# Patient Record
Sex: FEMALE | Race: WHITE | NOT HISPANIC OR LATINO | Employment: FULL TIME | URBAN - METROPOLITAN AREA
[De-identification: names, ages, dates, MRNs, and addresses within clinical notes are randomized per-mention and may not be internally consistent; named-entity substitution may affect disease eponyms.]

---

## 2018-01-12 ENCOUNTER — TRANSFERRED RECORDS (OUTPATIENT)
Dept: HEALTH INFORMATION MANAGEMENT | Facility: CLINIC | Age: 53
End: 2018-01-12

## 2019-08-07 ENCOUNTER — TRANSFERRED RECORDS (OUTPATIENT)
Dept: HEALTH INFORMATION MANAGEMENT | Facility: CLINIC | Age: 54
End: 2019-08-07

## 2019-08-15 ENCOUNTER — APPOINTMENT (OUTPATIENT)
Dept: CT IMAGING | Facility: CLINIC | Age: 54
End: 2019-08-15
Attending: EMERGENCY MEDICINE
Payer: COMMERCIAL

## 2019-08-15 ENCOUNTER — ANESTHESIA EVENT (OUTPATIENT)
Dept: SURGERY | Facility: CLINIC | Age: 54
End: 2019-08-15
Payer: COMMERCIAL

## 2019-08-15 ENCOUNTER — HOSPITAL ENCOUNTER (OUTPATIENT)
Facility: CLINIC | Age: 54
Discharge: HOME | End: 2019-08-16
Attending: EMERGENCY MEDICINE | Admitting: SURGERY
Payer: COMMERCIAL

## 2019-08-15 ENCOUNTER — ANESTHESIA (OUTPATIENT)
Dept: SURGERY | Facility: CLINIC | Age: 54
End: 2019-08-15
Payer: COMMERCIAL

## 2019-08-15 DIAGNOSIS — K35.30 ACUTE APPENDICITIS WITH LOCALIZED PERITONITIS, WITHOUT PERFORATION, ABSCESS, OR GANGRENE: ICD-10-CM

## 2019-08-15 PROBLEM — K37 APPENDICITIS: Status: ACTIVE | Noted: 2019-08-15

## 2019-08-15 LAB
ALBUMIN SERPL-MCNC: 3.8 G/DL (ref 3.4–5)
ALP SERPL-CCNC: 87 U/L (ref 40–150)
ALT SERPL W P-5'-P-CCNC: 16 U/L (ref 0–50)
ANION GAP SERPL CALCULATED.3IONS-SCNC: 8 MMOL/L (ref 3–14)
AST SERPL W P-5'-P-CCNC: 22 U/L (ref 0–45)
BASOPHILS # BLD AUTO: 0.1 10E9/L (ref 0–0.2)
BASOPHILS NFR BLD AUTO: 0.5 %
BILIRUB SERPL-MCNC: 0.5 MG/DL (ref 0.2–1.3)
BUN SERPL-MCNC: 16 MG/DL (ref 7–30)
CALCIUM SERPL-MCNC: 9.8 MG/DL (ref 8.5–10.1)
CHLORIDE SERPL-SCNC: 102 MMOL/L (ref 94–109)
CO2 SERPL-SCNC: 25 MMOL/L (ref 20–32)
CREAT SERPL-MCNC: 0.81 MG/DL (ref 0.52–1.04)
DIFFERENTIAL METHOD BLD: ABNORMAL
EOSINOPHIL # BLD AUTO: 0.2 10E9/L (ref 0–0.7)
EOSINOPHIL NFR BLD AUTO: 1.1 %
ERYTHROCYTE [DISTWIDTH] IN BLOOD BY AUTOMATED COUNT: 12.7 % (ref 10–15)
GFR SERPL CREATININE-BSD FRML MDRD: 82 ML/MIN/{1.73_M2}
GLUCOSE BLDC GLUCOMTR-MCNC: 117 MG/DL (ref 70–99)
GLUCOSE SERPL-MCNC: 129 MG/DL (ref 70–99)
HCT VFR BLD AUTO: 42.9 % (ref 35–47)
HGB BLD-MCNC: 14.4 G/DL (ref 11.7–15.7)
IMM GRANULOCYTES # BLD: 0.1 10E9/L (ref 0–0.4)
IMM GRANULOCYTES NFR BLD: 0.4 %
INR PPP: 0.96 (ref 0.86–1.14)
LACTATE BLD-SCNC: 2 MMOL/L (ref 0.7–2)
LIPASE SERPL-CCNC: 290 U/L (ref 73–393)
LYMPHOCYTES # BLD AUTO: 1.9 10E9/L (ref 0.8–5.3)
LYMPHOCYTES NFR BLD AUTO: 14.3 %
MCH RBC QN AUTO: 32.4 PG (ref 26.5–33)
MCHC RBC AUTO-ENTMCNC: 33.6 G/DL (ref 31.5–36.5)
MCV RBC AUTO: 96 FL (ref 78–100)
MONOCYTES # BLD AUTO: 1 10E9/L (ref 0–1.3)
MONOCYTES NFR BLD AUTO: 7.6 %
NEUTROPHILS # BLD AUTO: 10 10E9/L (ref 1.6–8.3)
NEUTROPHILS NFR BLD AUTO: 76.1 %
NRBC # BLD AUTO: 0 10*3/UL
NRBC BLD AUTO-RTO: 0 /100
PLATELET # BLD AUTO: 181 10E9/L (ref 150–450)
POTASSIUM SERPL-SCNC: 3.9 MMOL/L (ref 3.4–5.3)
PROT SERPL-MCNC: 7.4 G/DL (ref 6.8–8.8)
RADIOLOGIST FLAGS: ABNORMAL
RBC # BLD AUTO: 4.45 10E12/L (ref 3.8–5.2)
SODIUM SERPL-SCNC: 136 MMOL/L (ref 133–144)
WBC # BLD AUTO: 13.2 10E9/L (ref 4–11)

## 2019-08-15 PROCEDURE — 25800030 ZZH RX IP 258 OP 636: Performed by: NURSE ANESTHETIST, CERTIFIED REGISTERED

## 2019-08-15 PROCEDURE — 36000059 ZZH SURGERY LEVEL 3 EA 15 ADDTL MIN UMMC: Performed by: SURGERY

## 2019-08-15 PROCEDURE — 74177 CT ABD & PELVIS W/CONTRAST: CPT

## 2019-08-15 PROCEDURE — 82962 GLUCOSE BLOOD TEST: CPT

## 2019-08-15 PROCEDURE — 88304 TISSUE EXAM BY PATHOLOGIST: CPT | Performed by: SURGERY

## 2019-08-15 PROCEDURE — 36000057 ZZH SURGERY LEVEL 3 1ST 30 MIN - UMMC: Performed by: SURGERY

## 2019-08-15 PROCEDURE — 25000128 H RX IP 250 OP 636: Performed by: ANESTHESIOLOGY

## 2019-08-15 PROCEDURE — 25800030 ZZH RX IP 258 OP 636: Performed by: EMERGENCY MEDICINE

## 2019-08-15 PROCEDURE — 25000125 ZZHC RX 250: Performed by: NURSE ANESTHETIST, CERTIFIED REGISTERED

## 2019-08-15 PROCEDURE — 25000132 ZZH RX MED GY IP 250 OP 250 PS 637: Performed by: STUDENT IN AN ORGANIZED HEALTH CARE EDUCATION/TRAINING PROGRAM

## 2019-08-15 PROCEDURE — 96374 THER/PROPH/DIAG INJ IV PUSH: CPT | Performed by: EMERGENCY MEDICINE

## 2019-08-15 PROCEDURE — 37000009 ZZH ANESTHESIA TECHNICAL FEE, EACH ADDTL 15 MIN: Performed by: SURGERY

## 2019-08-15 PROCEDURE — 27210794 ZZH OR GENERAL SUPPLY STERILE: Performed by: SURGERY

## 2019-08-15 PROCEDURE — 37000008 ZZH ANESTHESIA TECHNICAL FEE, 1ST 30 MIN: Performed by: SURGERY

## 2019-08-15 PROCEDURE — 99284 EMERGENCY DEPT VISIT MOD MDM: CPT | Mod: Z6 | Performed by: EMERGENCY MEDICINE

## 2019-08-15 PROCEDURE — 80053 COMPREHEN METABOLIC PANEL: CPT | Performed by: EMERGENCY MEDICINE

## 2019-08-15 PROCEDURE — 25000128 H RX IP 250 OP 636: Performed by: SURGERY

## 2019-08-15 PROCEDURE — 83605 ASSAY OF LACTIC ACID: CPT | Performed by: EMERGENCY MEDICINE

## 2019-08-15 PROCEDURE — 25000128 H RX IP 250 OP 636: Performed by: EMERGENCY MEDICINE

## 2019-08-15 PROCEDURE — 25000128 H RX IP 250 OP 636: Performed by: STUDENT IN AN ORGANIZED HEALTH CARE EDUCATION/TRAINING PROGRAM

## 2019-08-15 PROCEDURE — 96376 TX/PRO/DX INJ SAME DRUG ADON: CPT | Performed by: EMERGENCY MEDICINE

## 2019-08-15 PROCEDURE — 83690 ASSAY OF LIPASE: CPT | Performed by: EMERGENCY MEDICINE

## 2019-08-15 PROCEDURE — 40000170 ZZH STATISTIC PRE-PROCEDURE ASSESSMENT II: Performed by: SURGERY

## 2019-08-15 PROCEDURE — 25000128 H RX IP 250 OP 636: Performed by: NURSE ANESTHETIST, CERTIFIED REGISTERED

## 2019-08-15 PROCEDURE — 99285 EMERGENCY DEPT VISIT HI MDM: CPT | Mod: 25 | Performed by: EMERGENCY MEDICINE

## 2019-08-15 PROCEDURE — 85025 COMPLETE CBC W/AUTO DIFF WBC: CPT | Performed by: EMERGENCY MEDICINE

## 2019-08-15 PROCEDURE — 96372 THER/PROPH/DIAG INJ SC/IM: CPT | Performed by: EMERGENCY MEDICINE

## 2019-08-15 PROCEDURE — 71000015 ZZH RECOVERY PHASE 1 LEVEL 2 EA ADDTL HR: Performed by: SURGERY

## 2019-08-15 PROCEDURE — 71000014 ZZH RECOVERY PHASE 1 LEVEL 2 FIRST HR: Performed by: SURGERY

## 2019-08-15 PROCEDURE — 25000565 ZZH ISOFLURANE, EA 15 MIN: Performed by: SURGERY

## 2019-08-15 PROCEDURE — 85610 PROTHROMBIN TIME: CPT | Performed by: EMERGENCY MEDICINE

## 2019-08-15 PROCEDURE — 25000125 ZZHC RX 250: Performed by: EMERGENCY MEDICINE

## 2019-08-15 PROCEDURE — 96361 HYDRATE IV INFUSION ADD-ON: CPT | Mod: 59 | Performed by: EMERGENCY MEDICINE

## 2019-08-15 RX ORDER — SODIUM CHLORIDE 9 MG/ML
1000 INJECTION, SOLUTION INTRAVENOUS CONTINUOUS
Status: DISCONTINUED | OUTPATIENT
Start: 2019-08-15 | End: 2019-08-15

## 2019-08-15 RX ORDER — FENTANYL CITRATE 50 UG/ML
25-50 INJECTION, SOLUTION INTRAMUSCULAR; INTRAVENOUS
Status: DISCONTINUED | OUTPATIENT
Start: 2019-08-15 | End: 2019-08-15 | Stop reason: HOSPADM

## 2019-08-15 RX ORDER — NALOXONE HYDROCHLORIDE 0.4 MG/ML
.1-.4 INJECTION, SOLUTION INTRAMUSCULAR; INTRAVENOUS; SUBCUTANEOUS
Status: DISCONTINUED | OUTPATIENT
Start: 2019-08-15 | End: 2019-08-15

## 2019-08-15 RX ORDER — ONDANSETRON 2 MG/ML
4 INJECTION INTRAMUSCULAR; INTRAVENOUS EVERY 30 MIN PRN
Status: DISCONTINUED | OUTPATIENT
Start: 2019-08-15 | End: 2019-08-15

## 2019-08-15 RX ORDER — HYDROMORPHONE HYDROCHLORIDE 1 MG/ML
1 INJECTION, SOLUTION INTRAMUSCULAR; INTRAVENOUS; SUBCUTANEOUS ONCE
Status: COMPLETED | OUTPATIENT
Start: 2019-08-15 | End: 2019-08-15

## 2019-08-15 RX ORDER — CLINDAMYCIN PHOSPHATE 11.9 MG/ML
SOLUTION TOPICAL DAILY PRN
Status: DISCONTINUED | OUTPATIENT
Start: 2019-08-15 | End: 2019-08-16 | Stop reason: HOSPADM

## 2019-08-15 RX ORDER — PROPOFOL 10 MG/ML
INJECTION, EMULSION INTRAVENOUS PRN
Status: DISCONTINUED | OUTPATIENT
Start: 2019-08-15 | End: 2019-08-15

## 2019-08-15 RX ORDER — SODIUM CHLORIDE, SODIUM LACTATE, POTASSIUM CHLORIDE, CALCIUM CHLORIDE 600; 310; 30; 20 MG/100ML; MG/100ML; MG/100ML; MG/100ML
INJECTION, SOLUTION INTRAVENOUS CONTINUOUS PRN
Status: DISCONTINUED | OUTPATIENT
Start: 2019-08-15 | End: 2019-08-15

## 2019-08-15 RX ORDER — ZOLMITRIPTAN 5 MG/1
1 SPRAY NASAL
Status: DISCONTINUED | OUTPATIENT
Start: 2019-08-15 | End: 2019-08-15

## 2019-08-15 RX ORDER — EZETIMIBE 10 MG/1
10 TABLET ORAL DAILY
COMMUNITY

## 2019-08-15 RX ORDER — OXYCODONE HYDROCHLORIDE 5 MG/1
5 TABLET ORAL EVERY 6 HOURS PRN
Qty: 5 TABLET | Refills: 0 | Status: SHIPPED | OUTPATIENT
Start: 2019-08-15 | End: 2019-08-20

## 2019-08-15 RX ORDER — DIAZEPAM 2 MG
2 TABLET ORAL EVERY 12 HOURS PRN
COMMUNITY
End: 2020-11-23

## 2019-08-15 RX ORDER — LAMOTRIGINE 25 MG/1
50 TABLET ORAL EVERY MORNING
Status: DISCONTINUED | OUTPATIENT
Start: 2019-08-16 | End: 2019-08-16 | Stop reason: HOSPADM

## 2019-08-15 RX ORDER — ACETAMINOPHEN 325 MG/1
650 TABLET ORAL EVERY 4 HOURS PRN
Status: DISCONTINUED | OUTPATIENT
Start: 2019-08-15 | End: 2019-08-16 | Stop reason: HOSPADM

## 2019-08-15 RX ORDER — ONDANSETRON 2 MG/ML
4 INJECTION INTRAMUSCULAR; INTRAVENOUS EVERY 6 HOURS PRN
Status: DISCONTINUED | OUTPATIENT
Start: 2019-08-15 | End: 2019-08-15

## 2019-08-15 RX ORDER — LABETALOL HYDROCHLORIDE 5 MG/ML
10 INJECTION, SOLUTION INTRAVENOUS
Status: DISCONTINUED | OUTPATIENT
Start: 2019-08-15 | End: 2019-08-15 | Stop reason: HOSPADM

## 2019-08-15 RX ORDER — ALMOTRIPTAN 12.5 MG/1
12.5 TABLET, FILM COATED ORAL DAILY PRN
Status: DISCONTINUED | OUTPATIENT
Start: 2019-08-15 | End: 2019-08-16 | Stop reason: HOSPADM

## 2019-08-15 RX ORDER — ACETAMINOPHEN 325 MG/1
650 TABLET ORAL EVERY 6 HOURS PRN
Status: DISCONTINUED | OUTPATIENT
Start: 2019-08-15 | End: 2019-08-15 | Stop reason: DRUGHIGH

## 2019-08-15 RX ORDER — CYCLOBENZAPRINE HCL 10 MG
10 TABLET ORAL AT BEDTIME
COMMUNITY

## 2019-08-15 RX ORDER — VILAZODONE HYDROCHLORIDE 10 MG/1
10 TABLET ORAL AT BEDTIME
Status: DISCONTINUED | OUTPATIENT
Start: 2019-08-15 | End: 2019-08-16 | Stop reason: HOSPADM

## 2019-08-15 RX ORDER — ACYCLOVIR 50 MG/G
CREAM TOPICAL
Status: DISCONTINUED | OUTPATIENT
Start: 2019-08-15 | End: 2019-08-15 | Stop reason: CLARIF

## 2019-08-15 RX ORDER — CEFTRIAXONE 2 G/1
2 INJECTION, POWDER, FOR SOLUTION INTRAMUSCULAR; INTRAVENOUS EVERY 24 HOURS
Status: DISCONTINUED | OUTPATIENT
Start: 2019-08-15 | End: 2019-08-15

## 2019-08-15 RX ORDER — HYDROMORPHONE HYDROCHLORIDE 1 MG/ML
.3-.5 INJECTION, SOLUTION INTRAMUSCULAR; INTRAVENOUS; SUBCUTANEOUS EVERY 5 MIN PRN
Status: DISCONTINUED | OUTPATIENT
Start: 2019-08-15 | End: 2019-08-15 | Stop reason: HOSPADM

## 2019-08-15 RX ORDER — LABETALOL HYDROCHLORIDE 5 MG/ML
INJECTION, SOLUTION INTRAVENOUS PRN
Status: DISCONTINUED | OUTPATIENT
Start: 2019-08-15 | End: 2019-08-15

## 2019-08-15 RX ORDER — OLANZAPINE 10 MG/2ML
2.5 INJECTION, POWDER, FOR SOLUTION INTRAMUSCULAR ONCE
Status: COMPLETED | OUTPATIENT
Start: 2019-08-15 | End: 2019-08-15

## 2019-08-15 RX ORDER — HYDROMORPHONE HYDROCHLORIDE 1 MG/ML
0.5 INJECTION, SOLUTION INTRAMUSCULAR; INTRAVENOUS; SUBCUTANEOUS
Status: COMPLETED | OUTPATIENT
Start: 2019-08-15 | End: 2019-08-15

## 2019-08-15 RX ORDER — LIDOCAINE 40 MG/G
CREAM TOPICAL
Status: DISCONTINUED | OUTPATIENT
Start: 2019-08-15 | End: 2019-08-16 | Stop reason: HOSPADM

## 2019-08-15 RX ORDER — CLONIDINE HYDROCHLORIDE 0.1 MG/1
0.1 TABLET ORAL 3 TIMES DAILY
Status: DISCONTINUED | OUTPATIENT
Start: 2019-08-16 | End: 2019-08-16 | Stop reason: HOSPADM

## 2019-08-15 RX ORDER — VILAZODONE HYDROCHLORIDE 10 MG/1
10 TABLET ORAL AT BEDTIME
COMMUNITY

## 2019-08-15 RX ORDER — HYDROMORPHONE HCL/0.9% NACL/PF 0.2MG/0.2
0.2 SYRINGE (ML) INTRAVENOUS
Status: DISCONTINUED | OUTPATIENT
Start: 2019-08-15 | End: 2019-08-15

## 2019-08-15 RX ORDER — BUPIVACAINE HYDROCHLORIDE 5 MG/ML
INJECTION, SOLUTION PERINEURAL PRN
Status: DISCONTINUED | OUTPATIENT
Start: 2019-08-15 | End: 2019-08-15 | Stop reason: HOSPADM

## 2019-08-15 RX ORDER — ONDANSETRON 2 MG/ML
4 INJECTION INTRAMUSCULAR; INTRAVENOUS EVERY 30 MIN PRN
Status: DISCONTINUED | OUTPATIENT
Start: 2019-08-15 | End: 2019-08-15 | Stop reason: HOSPADM

## 2019-08-15 RX ORDER — ONDANSETRON 2 MG/ML
4 INJECTION INTRAMUSCULAR; INTRAVENOUS EVERY 6 HOURS PRN
Status: DISCONTINUED | OUTPATIENT
Start: 2019-08-15 | End: 2019-08-16 | Stop reason: HOSPADM

## 2019-08-15 RX ORDER — NALOXONE HYDROCHLORIDE 0.4 MG/ML
.1-.4 INJECTION, SOLUTION INTRAMUSCULAR; INTRAVENOUS; SUBCUTANEOUS
Status: DISCONTINUED | OUTPATIENT
Start: 2019-08-15 | End: 2019-08-16 | Stop reason: HOSPADM

## 2019-08-15 RX ORDER — OXYCODONE HYDROCHLORIDE 5 MG/1
5-10 TABLET ORAL
Status: DISCONTINUED | OUTPATIENT
Start: 2019-08-15 | End: 2019-08-16 | Stop reason: HOSPADM

## 2019-08-15 RX ORDER — LOSARTAN POTASSIUM 25 MG/1
25 TABLET ORAL DAILY
COMMUNITY

## 2019-08-15 RX ORDER — SODIUM CHLORIDE, SODIUM LACTATE, POTASSIUM CHLORIDE, CALCIUM CHLORIDE 600; 310; 30; 20 MG/100ML; MG/100ML; MG/100ML; MG/100ML
INJECTION, SOLUTION INTRAVENOUS CONTINUOUS
Status: DISCONTINUED | OUTPATIENT
Start: 2019-08-15 | End: 2019-08-15 | Stop reason: HOSPADM

## 2019-08-15 RX ORDER — CLINDAMYCIN PHOSPHATE 11.9 MG/ML
SOLUTION TOPICAL DAILY PRN
COMMUNITY

## 2019-08-15 RX ORDER — ACYCLOVIR 400 MG/1
400 TABLET ORAL 2 TIMES DAILY
Status: DISCONTINUED | OUTPATIENT
Start: 2019-08-15 | End: 2019-08-16 | Stop reason: HOSPADM

## 2019-08-15 RX ORDER — ALBUTEROL SULFATE 90 UG/1
2 AEROSOL, METERED RESPIRATORY (INHALATION) EVERY 6 HOURS PRN
Status: DISCONTINUED | OUTPATIENT
Start: 2019-08-15 | End: 2019-08-16 | Stop reason: HOSPADM

## 2019-08-15 RX ORDER — IOPAMIDOL 755 MG/ML
88 INJECTION, SOLUTION INTRAVASCULAR ONCE
Status: COMPLETED | OUTPATIENT
Start: 2019-08-15 | End: 2019-08-15

## 2019-08-15 RX ORDER — CLONIDINE HYDROCHLORIDE 0.1 MG/1
0.1 TABLET ORAL 3 TIMES DAILY
COMMUNITY

## 2019-08-15 RX ORDER — FENTANYL CITRATE 50 UG/ML
50 INJECTION, SOLUTION INTRAMUSCULAR; INTRAVENOUS EVERY 10 MIN PRN
Status: DISCONTINUED | OUTPATIENT
Start: 2019-08-15 | End: 2019-08-15 | Stop reason: HOSPADM

## 2019-08-15 RX ORDER — ONDANSETRON 2 MG/ML
INJECTION INTRAMUSCULAR; INTRAVENOUS PRN
Status: DISCONTINUED | OUTPATIENT
Start: 2019-08-15 | End: 2019-08-15

## 2019-08-15 RX ORDER — ONDANSETRON 4 MG/1
4 TABLET, ORALLY DISINTEGRATING ORAL EVERY 30 MIN PRN
Status: DISCONTINUED | OUTPATIENT
Start: 2019-08-15 | End: 2019-08-15 | Stop reason: HOSPADM

## 2019-08-15 RX ORDER — ONDANSETRON 4 MG/1
4 TABLET, ORALLY DISINTEGRATING ORAL EVERY 30 MIN PRN
Status: DISCONTINUED | OUTPATIENT
Start: 2019-08-15 | End: 2019-08-15

## 2019-08-15 RX ORDER — OLOPATADINE HYDROCHLORIDE 1 MG/ML
1 SOLUTION/ DROPS OPHTHALMIC 2 TIMES DAILY PRN
COMMUNITY

## 2019-08-15 RX ORDER — PIPERACILLIN SODIUM, TAZOBACTAM SODIUM 3; .375 G/15ML; G/15ML
3.38 INJECTION, POWDER, LYOPHILIZED, FOR SOLUTION INTRAVENOUS ONCE
Status: COMPLETED | OUTPATIENT
Start: 2019-08-15 | End: 2019-08-15

## 2019-08-15 RX ORDER — MULTIVITAMIN,THERAPEUTIC
1 TABLET ORAL DAILY
COMMUNITY

## 2019-08-15 RX ORDER — TRAMADOL HYDROCHLORIDE 100 MG/1
100 TABLET, EXTENDED RELEASE ORAL 2 TIMES DAILY
COMMUNITY

## 2019-08-15 RX ORDER — LEVOTHYROXINE SODIUM 112 UG/1
112 TABLET ORAL EVERY MORNING
COMMUNITY

## 2019-08-15 RX ORDER — SODIUM CHLORIDE, SODIUM LACTATE, POTASSIUM CHLORIDE, CALCIUM CHLORIDE 600; 310; 30; 20 MG/100ML; MG/100ML; MG/100ML; MG/100ML
1000 INJECTION, SOLUTION INTRAVENOUS CONTINUOUS
Status: DISCONTINUED | OUTPATIENT
Start: 2019-08-15 | End: 2019-08-16

## 2019-08-15 RX ORDER — LIDOCAINE 40 MG/G
CREAM TOPICAL
Status: DISCONTINUED | OUTPATIENT
Start: 2019-08-15 | End: 2019-08-15

## 2019-08-15 RX ORDER — LAMOTRIGINE 25 MG/1
50 TABLET ORAL EVERY MORNING
COMMUNITY

## 2019-08-15 RX ORDER — ONDANSETRON 4 MG/1
4 TABLET, ORALLY DISINTEGRATING ORAL EVERY 6 HOURS PRN
Status: DISCONTINUED | OUTPATIENT
Start: 2019-08-15 | End: 2019-08-15

## 2019-08-15 RX ORDER — FENTANYL CITRATE 50 UG/ML
INJECTION, SOLUTION INTRAMUSCULAR; INTRAVENOUS PRN
Status: DISCONTINUED | OUTPATIENT
Start: 2019-08-15 | End: 2019-08-15

## 2019-08-15 RX ORDER — ALMOTRIPTAN 12.5 MG/1
12.5 TABLET, FILM COATED ORAL DAILY PRN
COMMUNITY
End: 2020-11-23

## 2019-08-15 RX ORDER — LEVOTHYROXINE SODIUM 112 UG/1
112 TABLET ORAL EVERY MORNING
Status: DISCONTINUED | OUTPATIENT
Start: 2019-08-16 | End: 2019-08-16 | Stop reason: HOSPADM

## 2019-08-15 RX ORDER — RIZATRIPTAN BENZOATE 10 MG/1
10 TABLET, ORALLY DISINTEGRATING ORAL
Status: DISCONTINUED | OUTPATIENT
Start: 2019-08-15 | End: 2019-08-15

## 2019-08-15 RX ORDER — NAPROXEN 250 MG/1
250 TABLET ORAL 2 TIMES DAILY WITH MEALS
COMMUNITY
End: 2020-12-31

## 2019-08-15 RX ORDER — LIDOCAINE HYDROCHLORIDE 20 MG/ML
INJECTION, SOLUTION INFILTRATION; PERINEURAL PRN
Status: DISCONTINUED | OUTPATIENT
Start: 2019-08-15 | End: 2019-08-15

## 2019-08-15 RX ORDER — HYDROMORPHONE HCL/0.9% NACL/PF 0.2MG/0.2
0.2 SYRINGE (ML) INTRAVENOUS
Status: DISCONTINUED | OUTPATIENT
Start: 2019-08-15 | End: 2019-08-16 | Stop reason: HOSPADM

## 2019-08-15 RX ORDER — ONDANSETRON 4 MG/1
4 TABLET, ORALLY DISINTEGRATING ORAL EVERY 6 HOURS PRN
Status: DISCONTINUED | OUTPATIENT
Start: 2019-08-15 | End: 2019-08-16 | Stop reason: HOSPADM

## 2019-08-15 RX ORDER — RIZATRIPTAN BENZOATE 10 MG/1
10 TABLET, ORALLY DISINTEGRATING ORAL
COMMUNITY

## 2019-08-15 RX ORDER — ZOLMITRIPTAN 5 MG/1
1 SPRAY NASAL
COMMUNITY

## 2019-08-15 RX ADMIN — MIDAZOLAM 2 MG: 1 INJECTION INTRAMUSCULAR; INTRAVENOUS at 15:48

## 2019-08-15 RX ADMIN — SODIUM CHLORIDE 1000 ML: 9 INJECTION, SOLUTION INTRAVENOUS at 04:01

## 2019-08-15 RX ADMIN — HYDROMORPHONE HYDROCHLORIDE 0.5 MG: 1 INJECTION, SOLUTION INTRAMUSCULAR; INTRAVENOUS; SUBCUTANEOUS at 13:06

## 2019-08-15 RX ADMIN — HYDROMORPHONE HYDROCHLORIDE 0.3 MG: 1 INJECTION, SOLUTION INTRAMUSCULAR; INTRAVENOUS; SUBCUTANEOUS at 20:08

## 2019-08-15 RX ADMIN — LIDOCAINE HYDROCHLORIDE 80 MG: 20 INJECTION, SOLUTION INFILTRATION; PERINEURAL at 16:00

## 2019-08-15 RX ADMIN — HYDROMORPHONE HYDROCHLORIDE 1 MG: 1 INJECTION, SOLUTION INTRAMUSCULAR; INTRAVENOUS; SUBCUTANEOUS at 04:05

## 2019-08-15 RX ADMIN — PHENYLEPHRINE HYDROCHLORIDE 100 MCG: 10 INJECTION INTRAVENOUS at 17:38

## 2019-08-15 RX ADMIN — LABETALOL HYDROCHLORIDE 5 MG: 5 INJECTION INTRAVENOUS at 17:30

## 2019-08-15 RX ADMIN — OLANZAPINE 2.5 MG: 10 INJECTION, POWDER, FOR SOLUTION INTRAMUSCULAR at 04:01

## 2019-08-15 RX ADMIN — FENTANYL CITRATE 50 MCG: 50 INJECTION, SOLUTION INTRAMUSCULAR; INTRAVENOUS at 15:52

## 2019-08-15 RX ADMIN — LABETALOL HYDROCHLORIDE 5 MG: 5 INJECTION INTRAVENOUS at 16:38

## 2019-08-15 RX ADMIN — HYDROMORPHONE HYDROCHLORIDE 0.3 MG: 1 INJECTION, SOLUTION INTRAMUSCULAR; INTRAVENOUS; SUBCUTANEOUS at 19:22

## 2019-08-15 RX ADMIN — CEFTRIAXONE SODIUM 2 G: 2 INJECTION, POWDER, FOR SOLUTION INTRAMUSCULAR; INTRAVENOUS at 13:18

## 2019-08-15 RX ADMIN — SODIUM CHLORIDE, POTASSIUM CHLORIDE, SODIUM LACTATE AND CALCIUM CHLORIDE: 600; 310; 30; 20 INJECTION, SOLUTION INTRAVENOUS at 16:17

## 2019-08-15 RX ADMIN — PHENYLEPHRINE HYDROCHLORIDE 100 MCG: 10 INJECTION INTRAVENOUS at 17:35

## 2019-08-15 RX ADMIN — Medication 0.2 MG: at 22:35

## 2019-08-15 RX ADMIN — HYDROMORPHONE HYDROCHLORIDE 0.5 MG: 1 INJECTION, SOLUTION INTRAMUSCULAR; INTRAVENOUS; SUBCUTANEOUS at 06:06

## 2019-08-15 RX ADMIN — HYDROMORPHONE HYDROCHLORIDE 0.5 MG: 1 INJECTION, SOLUTION INTRAMUSCULAR; INTRAVENOUS; SUBCUTANEOUS at 17:54

## 2019-08-15 RX ADMIN — SODIUM CHLORIDE: 900 INJECTION, SOLUTION INTRAVENOUS at 15:48

## 2019-08-15 RX ADMIN — FENTANYL CITRATE 50 MCG: 50 INJECTION, SOLUTION INTRAMUSCULAR; INTRAVENOUS at 16:30

## 2019-08-15 RX ADMIN — HYDROMORPHONE HYDROCHLORIDE 0.3 MG: 1 INJECTION, SOLUTION INTRAMUSCULAR; INTRAVENOUS; SUBCUTANEOUS at 20:45

## 2019-08-15 RX ADMIN — FENTANYL CITRATE 50 MCG: 50 INJECTION, SOLUTION INTRAMUSCULAR; INTRAVENOUS at 17:10

## 2019-08-15 RX ADMIN — ROCURONIUM BROMIDE 10 MG: 10 INJECTION INTRAVENOUS at 16:54

## 2019-08-15 RX ADMIN — SUGAMMADEX 150 MG: 100 INJECTION, SOLUTION INTRAVENOUS at 18:31

## 2019-08-15 RX ADMIN — HYDROMORPHONE HYDROCHLORIDE 0.5 MG: 1 INJECTION, SOLUTION INTRAMUSCULAR; INTRAVENOUS; SUBCUTANEOUS at 07:54

## 2019-08-15 RX ADMIN — ROCURONIUM BROMIDE 20 MG: 10 INJECTION INTRAVENOUS at 16:30

## 2019-08-15 RX ADMIN — SODIUM CHLORIDE 1000 ML: 900 INJECTION, SOLUTION INTRAVENOUS at 07:58

## 2019-08-15 RX ADMIN — ONDANSETRON 4 MG: 2 INJECTION INTRAMUSCULAR; INTRAVENOUS at 17:25

## 2019-08-15 RX ADMIN — FENTANYL CITRATE 50 MCG: 50 INJECTION, SOLUTION INTRAMUSCULAR; INTRAVENOUS at 17:27

## 2019-08-15 RX ADMIN — FENTANYL CITRATE 50 MCG: 50 INJECTION INTRAMUSCULAR; INTRAVENOUS at 14:39

## 2019-08-15 RX ADMIN — IOPAMIDOL 88 ML: 755 INJECTION, SOLUTION INTRAVENOUS at 05:19

## 2019-08-15 RX ADMIN — ACETAMINOPHEN 650 MG: 325 TABLET, FILM COATED ORAL at 20:40

## 2019-08-15 RX ADMIN — ROCURONIUM BROMIDE 30 MG: 10 INJECTION INTRAVENOUS at 16:08

## 2019-08-15 RX ADMIN — FENTANYL CITRATE 50 MCG: 50 INJECTION, SOLUTION INTRAMUSCULAR; INTRAVENOUS at 16:00

## 2019-08-15 RX ADMIN — PIPERACILLIN SODIUM,TAZOBACTAM SODIUM 3.38 G: 3; .375 INJECTION, POWDER, FOR SOLUTION INTRAVENOUS at 06:06

## 2019-08-15 RX ADMIN — Medication 80 MG: at 16:00

## 2019-08-15 RX ADMIN — SODIUM CHLORIDE 1000 ML: 9 INJECTION, SOLUTION INTRAVENOUS at 06:05

## 2019-08-15 RX ADMIN — METRONIDAZOLE 500 MG: 500 INJECTION, SOLUTION INTRAVENOUS at 12:11

## 2019-08-15 RX ADMIN — PROPOFOL 130 MG: 10 INJECTION, EMULSION INTRAVENOUS at 16:00

## 2019-08-15 RX ADMIN — HYDROMORPHONE HYDROCHLORIDE 0.5 MG: 1 INJECTION, SOLUTION INTRAMUSCULAR; INTRAVENOUS; SUBCUTANEOUS at 10:00

## 2019-08-15 RX ADMIN — SODIUM CHLORIDE 73 ML: 9 INJECTION, SOLUTION INTRAVENOUS at 05:19

## 2019-08-15 ASSESSMENT — ACTIVITIES OF DAILY LIVING (ADL)
DRESS: 0-->INDEPENDENT
BATHING: 0-->INDEPENDENT
NUMBER_OF_TIMES_PATIENT_HAS_FALLEN_WITHIN_LAST_SIX_MONTHS: 4
SWALLOWING: 0-->SWALLOWS FOODS/LIQUIDS WITHOUT DIFFICULTY
TOILETING: 0-->INDEPENDENT
AMBULATION: 0-->INDEPENDENT
RETIRED_COMMUNICATION: 0-->UNDERSTANDS/COMMUNICATES WITHOUT DIFFICULTY
FALL_HISTORY_WITHIN_LAST_SIX_MONTHS: YES
TRANSFERRING: 0-->INDEPENDENT
RETIRED_EATING: 0-->INDEPENDENT
COGNITION: 0 - NO COGNITION ISSUES REPORTED

## 2019-08-15 ASSESSMENT — PAIN DESCRIPTION - DESCRIPTORS
DESCRIPTORS: STABBING
DESCRIPTORS: DISCOMFORT
DESCRIPTORS: TENDER

## 2019-08-15 ASSESSMENT — MIFFLIN-ST. JEOR: SCORE: 1212.77

## 2019-08-15 NOTE — OR NURSING
Pt self administered her nasal Zomeg for migraine headaches.  Dr. Toney of anesthesia stated pt may take her home dose.

## 2019-08-15 NOTE — ANESTHESIA PREPROCEDURE EVALUATION
Anesthesia Pre-Procedure Evaluation    Patient: Toshia Savage   MRN:     [id number] Gender:   female   Age:    54 year old :      1965        Preoperative Diagnosis: Acute Appendicitis   Procedure(s):  APPENDECTOMY, LAPAROSCOPIC, possible Open     Past Medical History:   Diagnosis Date     Anorexia nervosas     has since 10th grade     Asthma      Chemical dependency (H)     marijuana      Chronic back pain     chronic tranadol     Concussion 10/11/94     Cyst (solitary) of breast     right - removed     Depression with anxiety 04    treated     Exercise-induced asthma 2011     Herpes labialis      Hypothyroid      Migraine     followed by Neuro     Pneumonia     hosp x 4days     Reflex sympathetic dystrophy     left foot     Vitamin D deficiency       Past Surgical History:   Procedure Laterality Date     C NONSPECIFIC PROCEDURE      cryosurgery     C NONSPECIFIC PROCEDURE      right breast cyst benign     C NONSPECIFIC PROCEDURE      left     COLONOSCOPY  11     LAPAROSCOPIC HYSTERECTOMY TOTAL      BSO.  heavy menses, ovarian polyps, fam hx uterine ca     LAPAROSCOPY PROCEDURE UNLISTED       TUBAL LIGATION            Anesthesia Evaluation     . Pt has had prior anesthetic. Type: General    No history of anesthetic complications          ROS/MED HX    ENT/Pulmonary:     (+)Intermittent asthma , . .    Neurologic: Comment: Took Zolmatriptan today    (+)migraines,     Cardiovascular:     (+) hypertension----. : . . . :. .       METS/Exercise Tolerance:     Hematologic:         Musculoskeletal:         GI/Hepatic:     (+) GERD       Renal/Genitourinary:         Endo:     (+) thyroid problem .      Psychiatric:     (+) psychiatric history       Infectious Disease:         Malignancy:         Other:    (+) H/O Chronic Pain,                       PHYSICAL EXAM:   Mental Status/Neuro: A/A/O   Airway: Facies: Feasible  Mallampati: III  Mouth/Opening: Full  TM  "distance: < 6 cm  Neck ROM: Full   Respiratory: Auscultation: CTAB     Resp. Rate: Normal     Resp. Effort: Normal      CV: Rhythm: Regular  Rate: Age appropriate  Heart: Normal Sounds  Edema: None   Comments:      Dental: Normal Dentition                LABS:  CBC:   Lab Results   Component Value Date    WBC 13.2 (H) 08/15/2019    WBC 6.0 05/08/2012    HGB 14.4 08/15/2019    HGB 13.4 05/08/2012    HCT 42.9 08/15/2019    HCT 38.1 05/08/2012     08/15/2019     05/08/2012     BMP:   Lab Results   Component Value Date     08/15/2019    POTASSIUM 3.9 08/15/2019    POTASSIUM 5.3 12/10/2012    CHLORIDE 102 08/15/2019    CO2 25 08/15/2019    BUN 16 08/15/2019    BUN 19 12/04/2009    CR 0.81 08/15/2019    CR 0.85 12/10/2012     (H) 08/15/2019     COAGS:   Lab Results   Component Value Date    INR 0.96 08/15/2019     POC:   Lab Results   Component Value Date     (H) 08/15/2019     OTHER:   Lab Results   Component Value Date    LACT 2.0 08/15/2019    JUAN 9.8 08/15/2019    ALBUMIN 3.8 08/15/2019    PROTTOTAL 7.4 08/15/2019    ALT 16 08/15/2019    AST 22 08/15/2019    ALKPHOS 87 08/15/2019    BILITOTAL 0.5 08/15/2019    LIPASE 290 08/15/2019    TSH 1.96 12/10/2012    T4 0.73 12/10/2012        Preop Vitals    BP Readings from Last 3 Encounters:   08/15/19 105/74   12/27/12 100/80   05/08/12 112/80    Pulse Readings from Last 3 Encounters:   08/15/19 109   12/27/12 103   05/08/12 80      Resp Readings from Last 3 Encounters:   08/15/19 18   03/02/12 18   05/30/10 16    SpO2 Readings from Last 3 Encounters:   08/15/19 96%   12/27/12 100%   03/02/12 100%      Temp Readings from Last 1 Encounters:   08/15/19 36.7  C (98.1  F) (Oral)    Ht Readings from Last 1 Encounters:   08/15/19 1.6 m (5' 3\")      Wt Readings from Last 1 Encounters:   08/15/19 64.9 kg (143 lb)    Estimated body mass index is 25.33 kg/m  as calculated from the following:    Height as of this encounter: 1.6 m (5' 3\").    Weight as " of this encounter: 64.9 kg (143 lb).     LDA:  Peripheral IV 08/15/19 (Active)   Number of days: 0       Peripheral IV 08/15/19 Right (Active)   Number of days: 0        Assessment:   ASA SCORE: 2    H&P: History and physical reviewed and following examination; no interval change.    NPO Status: NPO Appropriate     Plan:   Anes. Type:  General   Pre-Medication: None   Induction:  IV (Standard)   Airway: ETT; Oral   Access/Monitoring: PIV   Maintenance: Balanced     Postop Plan:   Postop Pain: Opioids  Postop Sedation/Airway: Not planned  Disposition: Inpatient/Admit     PONV Management:   Adult Risk Factors: Female, Postop Opioids   Prevention: Ondansetron     CONSENT: Direct conversation   Plan and risks discussed with: Patient   Blood Products: Consented (ALL Blood Products)                   Cristhian Toney MD

## 2019-08-15 NOTE — H&P
"[facility] Surgery Consultation    Toshia Savage MRN# [id number]   Age: 54 year old YOB: 1965     Date of Admission:  8/15/2019    Date of Consult:   8/15/19    Reason for consult: Acute appendicitis        Requesting service: ED                   Assessment and Plan:   Assessment:   Mrs. Savage is a 54 year old female with a PMH of hypothyroidism, laparoscopic total hysterectomy, and well controlled asthma who presents with acute onset of severe RLQ pain and tenderness associated with nausea and vomiting. Leukocytosis and CT of her abdomen are consistent with diagnosis of acute appendicitis.         Plan:   -add on for OR today for laparoscopic appendectomy, possible open  -pre-op orders in, consented and marked      Discussed with chief resident who will discuss with staff, Dr. Bijal Tanner  MS4, General Surgery      I agree with the above documented assessment and plan by the medical student, Adriana Tanner MS4, and have edited as needed above.     Mattie Martinez MD  PGY-2 General Surgery              Chief Complaint:   Abdominal pain concerning for appendicitis.          History of Present Illness:   Mrs. Savage is a 54 year old female with a PMHx of laparoscopic total hysterectomy, migraines, well controlled asthma, and hypothyroidism, and chronic back pain, who woke up around midnight with severe abdominal pain. She described the pain as \"the worst pain she has ever felt,\" and it was diffuse across her entire abdomen. She tried to reposition and take Maalox, but neither improved her pain. She then became nauseous and has vomited 10-12 times. Her pain subsequently migrated to the epigastric and RLQ regions without decreasing in intensity. She has not passed gas or had a BM since the pain started. She also endorses fevers and chills since the pain started.     The patient has had abdominal surgery once (hysterectomy in 2008) and did not have any issues with anesthesia. She " denies a personal or family history of blood clots. She does report that she had a lot of bleeding during her hysterectomy, but she does not have any known clotting disorders. She would like her brother to be contacted with updates.           Past Medical History:     Past Medical History:   Diagnosis Date     Anorexia nervosas 11/96    has since 10th grade     Asthma      Chemical dependency (H)     marijuana      Chronic back pain     chronic tranadol     Concussion 10/11/94     Cyst (solitary) of breast 5/98    right - removed     Depression with anxiety 04    treated     Exercise-induced asthma 11/8/2011     Herpes labialis      Hypothyroid      Migraine 1989    followed by Neuro     Pneumonia 8/99    hosp x 4days     Reflex sympathetic dystrophy     left foot     Vitamin D deficiency              Past Surgical History:     Past Surgical History:   Procedure Laterality Date     C NONSPECIFIC PROCEDURE  1988    cryosurgery     C NONSPECIFIC PROCEDURE  5/98    right breast cyst benign     C NONSPECIFIC PROCEDURE  2001    left     COLONOSCOPY  5-13-11     LAPAROSCOPIC HYSTERECTOMY TOTAL  6/08    BSO.  heavy menses, ovarian polyps, fam hx uterine ca     LAPAROSCOPY PROCEDURE UNLISTED  1987     TUBAL LIGATION  2004             Social History:     Social History     Tobacco Use     Smoking status: Current Every Day Smoker     Packs/day: 0.50     Years: 31.00     Pack years: 15.50     Types: Cigarettes     Smokeless tobacco: Never Used   Substance Use Topics     Alcohol use: Yes     Comment: 1-2 per weekends   Drinks 1 glass/night.  Smoked <1ppd for 40 years.   She lives with 2 cats and a 10 year old chicken.          Family History:     Family History   Problem Relation Age of Onset     Cancer Mother         uterine     Arthritis Mother      Depression Mother      Depression Father      Arthritis Father      Unknown/Adopted Father         limes disease     Respiratory Maternal Grandmother      Alzheimer Disease  Maternal Grandfather      Alzheimer Disease Paternal Grandmother      Alcohol/Drug Paternal Grandfather                 Allergies:     Allergies   Allergen Reactions     Asa [Aspirin]      Imitrex [Sumatriptan Succinate]      Rapid heart beat     Lanolin      Rash or headache     Prilosec Otc [Omeprazole Magnesium]      headache     Singulair Other (See Comments)     Rapid heart beat and increased asthma symptoms     Clindamycin Rash     Lanoxin Rash     Morphine Rash     Sulfa Drugs Rash     Tagamet Rash     Zoloft Rash             Medications:     Current Facility-Administered Medications   Medication     0.9% sodium chloride BOLUS     HYDROmorphone (PF) (DILAUDID) injection 0.5 mg     piperacillin-tazobactam (ZOSYN) 3.375 g vial to attach to  mL bag     sodium chloride 0.9% infusion     Current Outpatient Medications   Medication Sig     acyclovir (ZOVIRAX) 400 MG tablet Take 1 tablet by mouth 2 times daily. Due to be seen in November.  Needs to be seen b/4 further refills.     acyclovir (ZOVIRAX) 5 % cream Apply  topically 5 times daily.     albuterol (PROVENTIL HFA: VENTOLIN HFA) 108 (90 BASE) MCG/ACT inhaler Inhale 2 puffs into the lungs every 6 hours as needed for shortness of breath / dyspnea.     AXERT 12.5 MG OR TABS 1 TABLET NOW, MAY REPEAT IN 2 HOURS     azithromycin (ZITHROMAX) 250 MG tablet 2 po daily x one day then 1 po daily x 4 days     CYCLOBENZAPRINE HCL 10 MG OR TABS 1 TABLET daily     diazepam (VALIUM) 5 MG tablet Take 1-2 tablets by mouth every 8 hours as needed for anxiety.     escitalopram (LEXAPRO) 20 MG tablet Take 20 mg by mouth daily.     ESTRASORB 4.35 MG/1.74GM TD EMUL None Entered     gabapentin (NEURONTIN) 100 MG capsule Take 100 mg by mouth. 1 tablet by mouth every evening     levothyroxine (SYNTHROID) 88 MCG tablet Take 88 mcg by mouth every other day.     Levothyroxine Sodium 50 MCG CAPS Take  by mouth. 2 tablets every morning by mouth     milnacipran (SAVELLA) 50 MG TABS  Take 50 mg by mouth every morning.     MULTI-VITAMIN OR TABS 1 TABLET DAILY     PATANOL 0.1 % OP SOLN None Entered     prochlorperazine (COMPAZINE) 10 MG tablet Take 10 mg by mouth as needed.     SPIRONOLACTONE 25 MG OR TABS 1 TABLET twice daily     TRAMADOL  MG OR TB24 1 TABLET twice DAILY     UNABLE TO FIND Elderberry-zinc     vitamin D (ERGOCALCIFEROL) 09353 UNIT capsule Take 1 capsule by mouth once a week.             Review of Systems:   Negative other than HPI          Physical Exam:   All vitals have been reviewed  Temp:  [98.2  F (36.8  C)] 98.2  F (36.8  C)  Pulse:  [] 108  Resp:  [20] 20  BP: (120-135)/() 120/87  SpO2:  [100 %] 100 %  No intake or output data in the 24 hours ending 08/15/19 0549  Physical Exam:  General - mild distress, appears uncomfortable in bed  HEENT - normocephalic, atraumatic, EOMI  Cardio - tachycardic, regular rhythm  Pulm - non labored respirations on room air  Abdomen - exquisitely tender in RLQ, tender to palpation in epigastric region, soft, non-distended, localized peritonitis in RLQ  Neuro - moves all extremities without apparent deficit, non-focal  Extremities - no lower extremity edema, WWP, palpable bilateral DP  Psych - age appropriate mental status / engagement           Data:   All laboratory data reviewed    Results:  BMP  Recent Labs   Lab 08/15/19  0346      POTASSIUM 3.9   CHLORIDE 102   CO2 25   BUN 16   CR 0.81   *     CBC  Recent Labs   Lab 08/15/19  0346   WBC 13.2*   HGB 14.4        LFT  Recent Labs   Lab 08/15/19  0346   AST 22   ALT 16   ALKPHOS 87   BILITOTAL 0.5   ALBUMIN 3.8     Recent Labs   Lab 08/15/19  0346   *       Imaging:  CT Abdomen/Pelvis w/ Contrast 8/15/19  Dense material, possibly in appendicolith, at the origin of the appendix (series 5 image 282). The appendix is dilated up to 1.2 cm. There is gas of the appendiceal lumen, there are significant periappendiceal inflammatory changes, most  pronounced distally, near the tip. There is no adjacent fluid collection to suggest periappendiceal abscess. There is no definite free air to suggest perforation.  IMPRESSION: Uncomplicated acute appendicitis.

## 2019-08-15 NOTE — ED TRIAGE NOTES
BIB EMS for sudden epigastric pain radiating to RLQ since 12 am today while watching TV.  +Vomiting.  Denies diarrhea, urinary sx or back pain.  She received 4 mg iv zofran via EMS.

## 2019-08-15 NOTE — ED NOTES
Bed: ED06  Expected date:   Expected time:   Means of arrival:   Comments:  [facility] [place] [phone] F left sided abdominal pain

## 2019-08-15 NOTE — ED NOTES
[facility], [place]   ED Nurse to Floor Handoff     Toshia Savage is a 54 year old female who speaks English and lives alone,  in a home  They arrived in the ED by ambulance from home    ED Chief Complaint: Abdominal Pain    ED Dx;   Final diagnoses:   Acute appendicitis with localized peritonitis, without perforation, abscess, or gangrene         Needed?: No    Allergies:   Allergies   Allergen Reactions     Asa [Aspirin]      Imitrex [Sumatriptan Succinate]      Rapid heart beat     Lanolin      Rash or headache     Prilosec Otc [Omeprazole Magnesium]      headache     Singulair Other (See Comments)     Rapid heart beat and increased asthma symptoms     Clindamycin Rash     Lanoxin Rash     Morphine Rash     Sulfa Drugs Rash     Tagamet Rash     Zoloft Rash   .  Past Medical Hx:   Past Medical History:   Diagnosis Date     Anorexia nervosas 11/96    has since 10th grade     Asthma      Chemical dependency (H)     marijuana      Chronic back pain     chronic tranadol     Concussion 10/11/94     Cyst (solitary) of breast 5/98    right - removed     Depression with anxiety 04    treated     Exercise-induced asthma 11/8/2011     Herpes labialis      Hypothyroid      Migraine 1989    followed by Neuro     Pneumonia 8/99    hosp x 4days     Reflex sympathetic dystrophy     left foot     Vitamin D deficiency       Baseline Mental status: WDL  Current Mental Status changes: at basesline    Infection present or suspected this encounter: no  Sepsis suspected: No  Isolation type: No active isolations     Activity level - Baseline/Home:  Independent  Activity Level - Current:   Independent    Bariatric equipment needed?: No    In the ED these meds were given:   Medications   0.9% sodium chloride BOLUS (0 mLs Intravenous Stopped 8/15/19 0546)     Followed by   sodium chloride 0.9% infusion (has no administration in time range)   HYDROmorphone (PF) (DILAUDID) injection 0.5 mg (0.5 mg  Intravenous Given 8/15/19 0606)   OLANZapine (zyPREXA) injection 2.5 mg (2.5 mg Intramuscular Given 8/15/19 0401)   HYDROmorphone (PF) (DILAUDID) injection 1 mg (1 mg Intravenous Given 8/15/19 0405)   0.9% sodium chloride BOLUS (1,000 mLs Intravenous New Bag 8/15/19 0605)   iopamidol (ISOVUE-370) solution 88 mL (88 mLs Intravenous Given 8/15/19 0519)   sodium chloride 0.9 % bag 500mL for CT scan flush use (73 mLs Intravenous Given 8/15/19 0519)   piperacillin-tazobactam (ZOSYN) 3.375 g vial to attach to  mL bag (0 g Intravenous Stopped 8/15/19 0644)       Drips running?  No    Home pump  No    Current LDAs  Peripheral IV 08/15/19 (Active)   Number of days: 0       Peripheral IV 08/15/19 Right (Active)   Number of days: 0       Labs results:   Labs Ordered and Resulted from Time of ED Arrival Up to the Time of Departure from the ED   CBC WITH PLATELETS DIFFERENTIAL - Abnormal; Notable for the following components:       Result Value    WBC 13.2 (*)     Absolute Neutrophil 10.0 (*)     All other components within normal limits   COMPREHENSIVE METABOLIC PANEL - Abnormal; Notable for the following components:    Glucose 129 (*)     All other components within normal limits   LIPASE   LACTIC ACID WHOLE BLOOD       Imaging Studies:   Recent Results (from the past 24 hour(s))   CT Abdomen Pelvis w Contrast   Result Value    Radiologist flags Inflamed appendix with surrounding inflammatory (Urgent)    Narrative    EXAMINATION: CT ABDOMEN PELVIS W CONTRAST, 8/15/2019 5:24 AM    TECHNIQUE:  Helical CT images from the lung bases through the  symphysis pubis were obtained  with contrast. Contrast dose: 88 ml  isovue 370     COMPARISON: None    HISTORY: Abd pain, unspecified    FINDINGS:    Abdomen and pelvis:   There is a 4 mm phlebolith at the origin of the appendix (series 5  image 282). The appendix is dilated up to 1.2 cm with surrounding  inflammatory change of the mesenteric fat. There is no adjacent  fluid  collection to suggest periappendiceal abscess. There is no definite  free air to suggest perforation.    Scattered hypodensities in the liver, for instance in the right lobe  measuring 1.8 x 1.0 cm (series 5 image 68) compatible with benign  hepatic cyst. Additional subcentimeter hypodensities throughout the  liver are too small to accurately characterize, but likely represent  additional hepatic cysts. No suspicious hepatic lesions. Hepatic  vasculature is grossly patent. The gallbladder is unremarkable. No  intra or extrahepatic biliary ductal dilation. The pancreas is  unremarkable. The main pancreatic duct is not dilated. There is a  focal area of fatty infiltration within the anterior pancreatic head  (series 5 image 187). The spleen is unremarkable. The adrenal glands  are unremarkable. No nephrolithiasis or hydronephrosis. Multiple  pelvic phleboliths. The bladder is unremarkable. Hysterectomy. No  pelvic masses. The small and large bowel are normal caliber. There is  mild redundancy of the sigmoid colon. Focal epiploic appendage along  the ascending colon (series 5 image 234). No free air or fluid in the  abdomen. No lymphadenopathy in the abdomen or pelvis by size criteria.    No abdominal aortic aneurysm. There is mild to moderate  atherosclerotic calcification of the abdominal aorta and iliacs.    Lung bases: Streaky atelectasis in the lung bases right greater than  left. No suspicious pulmonary nodules in the lung bases. There is a  fat-containing hernia in the posterior left hemidiaphragm.    Bones and soft tissues: Mild degenerative changes of the thoracolumbar  spine. No suspicious osseous lesions.      Impression    IMPRESSION:   1. Dilated appendix with periappendiceal inflammatory changes  suggestive of acute appendicitis. No adjacent fluid collection to  suggest abscess formation. No definite free air to suggest  perforation.  2. Fat-containing hernia of the posterior left hemidiaphragm.  "    [Urgent Result: Inflamed appendix with surrounding inflammatory  changes concerning for acute appendicitis]    Finding was identified on 8/15/2019 5:28 AM.     Dr. Donohue was contacted by Dr. Rod at 8/15/2019 5:35 AM and  verbalized understanding of the urgent finding.            Recent vital signs:   BP (!) 138/94   Pulse 114   Temp 98.2  F (36.8  C) (Oral)   Resp 20   Ht 1.6 m (5' 3\")   Wt 64.9 kg (143 lb)   SpO2 98%   BMI 25.33 kg/m      Magen Coma Scale Score: 15 (08/15/19 0339)       Cardiac Rhythm: Other  Pt needs tele? No  Skin/wound Issues: None    Code Status: Full Code    Pain control: good    Nausea control: good    Abnormal labs/tests/findings requiring intervention: none    Family present during ED course? No   Family Comments/Social Situation comments: n/a    Tasks needing completion: None    Ruby Shipman RN  [facility] -- [phone] [place] ED  [phone] [facility] ED      "

## 2019-08-15 NOTE — ED PROVIDER NOTES
"  History     Chief Complaint   Patient presents with     Abdominal Pain     HPI  Toshia Savage is a 54 year old female history of migraine, fibromyalgia, hypertension among other medical problems who presents with acute onset of right-sided abdominal pain and vomiting since midnight.  Symptoms began quite acutely and progressed rapidly.  She called EMS.  On ED evaluation she is very uncomfortable actively vomiting with self-reported severe abdominal pain.    Prior to this incident had been feeling normal without any issues.  Does not recall any unusual foods or exposures.  No fevers or chills no preceding chest pain or shortness of breath.    No similar incidents before      I have reviewed the Medications, Allergies, Past Medical and Surgical History, and Social History in the Epic system.    Review of Systems   14 point review of symptoms was performed and is negative except as noted above.     Physical Exam   BP: (!) 135/104  Pulse: 71  Heart Rate: 117  Temp: 98.2  F (36.8  C)  Resp: 20  Height: 160 cm (5' 3\")  Weight: 64.9 kg (143 lb)  SpO2: 100 %      Physical Exam  GEN: Very uncomfortable appearing, vomiting, conversant.  HEENT: The head is normocephalic and atraumatic. Pupils are equal round and reactive to light. Extraocular motions are intact. There is no facial swelling. The neck is nontender and supple.   CV: Regular rate and rhythm without murmurs rubs or gallops. 2+ radial pulses bilaterally.  PULM: Clear to auscultation bilaterally.  ABD: Soft, diffusely uncomfortable to palpation, right sided and epigastric tenderness most prominent., nondistended.   EXT: Full range of motion.  No edema.  NEURO: Cranial nerves II through XII are intact and symmetric. Bilateral upper and lower extremities grossly show full range of motion without any focal deficits.   SKIN: No rashes, ecchymosis, or lacerations  PSYCH: Calm and cooperative, interactive.     ED Course        Procedures               Labs Ordered and " Resulted from Time of ED Arrival Up to the Time of Departure from the ED   CBC WITH PLATELETS DIFFERENTIAL - Abnormal; Notable for the following components:       Result Value    WBC 13.2 (*)     Absolute Neutrophil 10.0 (*)     All other components within normal limits   COMPREHENSIVE METABOLIC PANEL - Abnormal; Notable for the following components:    Glucose 129 (*)     All other components within normal limits   LIPASE   LACTIC ACID WHOLE BLOOD   INR            Assessments & Plan (with Medical Decision Making)   54-year-old female presenting with acute onset of abdominal pain, right-sided with vomiting, multiple episodes.  DDX is broad including appendicitis, bowel obstruction, colitis, diverticulitis, mesenteric ischemia, ischemic colitis, pancreatitis, ACS, among other causes.    Clinically the patient was quite ill appearing.  Labs and CT abdomen pelvis ordered  Received IV fluids, analgesics and antiemetics with improvement    Labs revealing only for mild leukocytosis  CT shows appendicitis    Patient discussed with general surgery and ultimately admitted to their service for planned appendectomy.      I have reviewed the nursing notes.    I have reviewed the findings, diagnosis, plan and need for follow up with the patient.    Current Discharge Medication List      START taking these medications    Details   oxyCODONE (ROXICODONE) 5 MG tablet Take 1 tablet (5 mg) by mouth every 6 hours as needed for severe pain  Qty: 5 tablet, Refills: 0    Associated Diagnoses: Acute appendicitis with localized peritonitis, without perforation, abscess, or gangrene             Final diagnoses:   Acute appendicitis with localized peritonitis, without perforation, abscess, or gangrene       8/15/2019   [facility], [place], EMERGENCY DEPARTMENT     Maverick Donohue MD  08/16/19 3441

## 2019-08-15 NOTE — ANESTHESIA CARE TRANSFER NOTE
Patient: Toshia Savage    Procedure(s):  APPENDECTOMY, LAPAROSCOPIC, possible Open    Diagnosis: Acute Appendicitis  Diagnosis Additional Information: No value filed.    Anesthesia Type:   General     Note:  Airway :Face Mask  Patient transferred to:PACU  Comments: Sleepy, awakens easily to stimuli. Stable, report to RN. Handoff Report: Identifed the Patient, Identified the Reponsible Provider, Reviewed the pertinent medical history, Discussed the surgical course, Reviewed Intra-OP anesthesia mangement and issues during anesthesia, Set expectations for post-procedure period and Allowed opportunity for questions and acknowledgement of understanding      Vitals: (Last set prior to Anesthesia Care Transfer)    CRNA VITALS  8/15/2019 1809 - 8/15/2019 1846      8/15/2019             EKG:  Sinus rhythm                Electronically Signed By: JOSE A Ren CRNA  August 15, 2019  6:46 PM

## 2019-08-16 VITALS
SYSTOLIC BLOOD PRESSURE: 136 MMHG | OXYGEN SATURATION: 98 % | HEART RATE: 106 BPM | DIASTOLIC BLOOD PRESSURE: 86 MMHG | WEIGHT: 143 LBS | BODY MASS INDEX: 25.34 KG/M2 | HEIGHT: 63 IN | RESPIRATION RATE: 16 BRPM | TEMPERATURE: 99.2 F

## 2019-08-16 LAB
ANION GAP SERPL CALCULATED.3IONS-SCNC: 2 MMOL/L (ref 3–14)
BUN SERPL-MCNC: 8 MG/DL (ref 7–30)
CALCIUM SERPL-MCNC: 8.9 MG/DL (ref 8.5–10.1)
CHLORIDE SERPL-SCNC: 108 MMOL/L (ref 94–109)
CO2 SERPL-SCNC: 28 MMOL/L (ref 20–32)
CREAT SERPL-MCNC: 0.79 MG/DL (ref 0.52–1.04)
ERYTHROCYTE [DISTWIDTH] IN BLOOD BY AUTOMATED COUNT: 13 % (ref 10–15)
GFR SERPL CREATININE-BSD FRML MDRD: 84 ML/MIN/{1.73_M2}
GLUCOSE SERPL-MCNC: 145 MG/DL (ref 70–99)
HCT VFR BLD AUTO: 39.8 % (ref 35–47)
HGB BLD-MCNC: 12.8 G/DL (ref 11.7–15.7)
MCH RBC QN AUTO: 32.3 PG (ref 26.5–33)
MCHC RBC AUTO-ENTMCNC: 32.2 G/DL (ref 31.5–36.5)
MCV RBC AUTO: 101 FL (ref 78–100)
PLATELET # BLD AUTO: 160 10E9/L (ref 150–450)
POTASSIUM SERPL-SCNC: 3.8 MMOL/L (ref 3.4–5.3)
RBC # BLD AUTO: 3.96 10E12/L (ref 3.8–5.2)
SODIUM SERPL-SCNC: 138 MMOL/L (ref 133–144)
WBC # BLD AUTO: 8.3 10E9/L (ref 4–11)

## 2019-08-16 PROCEDURE — 25000132 ZZH RX MED GY IP 250 OP 250 PS 637: Performed by: STUDENT IN AN ORGANIZED HEALTH CARE EDUCATION/TRAINING PROGRAM

## 2019-08-16 PROCEDURE — 36415 COLL VENOUS BLD VENIPUNCTURE: CPT | Performed by: STUDENT IN AN ORGANIZED HEALTH CARE EDUCATION/TRAINING PROGRAM

## 2019-08-16 PROCEDURE — 25000128 H RX IP 250 OP 636: Performed by: STUDENT IN AN ORGANIZED HEALTH CARE EDUCATION/TRAINING PROGRAM

## 2019-08-16 PROCEDURE — 85027 COMPLETE CBC AUTOMATED: CPT | Performed by: STUDENT IN AN ORGANIZED HEALTH CARE EDUCATION/TRAINING PROGRAM

## 2019-08-16 PROCEDURE — 25800030 ZZH RX IP 258 OP 636: Performed by: STUDENT IN AN ORGANIZED HEALTH CARE EDUCATION/TRAINING PROGRAM

## 2019-08-16 PROCEDURE — 80048 BASIC METABOLIC PNL TOTAL CA: CPT | Performed by: STUDENT IN AN ORGANIZED HEALTH CARE EDUCATION/TRAINING PROGRAM

## 2019-08-16 RX ADMIN — Medication 0.2 MG: at 04:56

## 2019-08-16 RX ADMIN — Medication 0.2 MG: at 00:33

## 2019-08-16 RX ADMIN — LEVOTHYROXINE SODIUM 112 MCG: 112 TABLET ORAL at 08:03

## 2019-08-16 RX ADMIN — ACYCLOVIR 400 MG: 400 TABLET ORAL at 08:04

## 2019-08-16 RX ADMIN — VILAZODONE HYDROCHLORIDE 10 MG: 10 TABLET ORAL at 00:14

## 2019-08-16 RX ADMIN — SODIUM CHLORIDE, POTASSIUM CHLORIDE, SODIUM LACTATE AND CALCIUM CHLORIDE 1000 ML: 600; 310; 30; 20 INJECTION, SOLUTION INTRAVENOUS at 04:51

## 2019-08-16 RX ADMIN — ACYCLOVIR 400 MG: 400 TABLET ORAL at 00:14

## 2019-08-16 RX ADMIN — ALMOTRIPTAN 12.5 MG: 12.5 TABLET, FILM COATED ORAL at 06:31

## 2019-08-16 RX ADMIN — LAMOTRIGINE 50 MG: 25 TABLET ORAL at 08:03

## 2019-08-16 RX ADMIN — CLONIDINE HYDROCHLORIDE 0.1 MG: 0.1 TABLET ORAL at 08:03

## 2019-08-16 RX ADMIN — ACETAMINOPHEN 650 MG: 325 TABLET, FILM COATED ORAL at 05:29

## 2019-08-16 RX ADMIN — OXYCODONE HYDROCHLORIDE 5 MG: 5 TABLET ORAL at 08:12

## 2019-08-16 NOTE — OR NURSING
"Dr. Parish called back and stated she updated the family-- the \"sister\" who was in the family peri.  "

## 2019-08-16 NOTE — PLAN OF CARE
DISCHARGE                         No discharge date for patient encounter.  ----------------------------------------------------------------------------  Discharged to: Home  Via: private transportation  Accompanied by: Family  Discharge Instructions: regular diet, no more than 10lbs, medications, follow up appointments, when to call the MD, aftercare instructions.  Prescriptions: To be filled by [place] discharge pharmacy; medication list reviewed & sent with pt  Follow Up Appointments: arranged; information given  Belongings: All sent with pt. Medications and purse from security  IV: d/c'd  Telemetry: n/a  Pt exhibits understanding of above discharge instructions; all questions answered.    Discharge Paperwork: Signed, copied, and sent home with patient.

## 2019-08-16 NOTE — OP NOTE
Procedure Date: 08/15/2019            PREOPERATIVE DIAGNOSIS:  Acute appendicitis.      POSTOPERATIVE DIAGNOSIS:  Acute appendicitis.      PROCEDURE PERFORMED:  Laparoscopic appendectomy.      ESTIMATED BLOOD LOSS:  30 mL.      ANESTHESIA:  General anesthesia.      SURGEON:  Shila Appiah MD      ASSISTANT SURGEON:  Pauline Parish, PGY7 and Mattie Martinez, PGY-2.      FINDINGS:  Included inflamed appendix, no perforation, no abscess or fluid collection.  The appendix was resected with no complications.      INDICATIONS:  Toshia Saavge is a 54-year-old female with a past medical history of hypothyroidism, laparoscopic total hysterectomy and well controlled asthma, who presented with acute onset of severe right lower quadrant pain on midnight the night of presentation associated with nausea and vomiting.  She additionally had a leukocytosis and CT scan findings consistent with a diagnosis of acute appendicitis.      DESCRIPTION OF PROCEDURE:  Ms. Savage was brought to the operating room.  General endotracheal anesthesia was obtained without any incident.  The patient was prepped and draped in the normal sterile fashion.  Timeout was performed, confirming the correct patient, the correct site of surgery, that antibiotics had been given and that the SCDs were on and functioning.  We began by performing a curvilinear infraumbilical 12 mm incision.  We created that port site using an open Dipesh technique.  Once the fascia was identified, it was attempted to be grasped by the Kocher; however, the fascia was very thin and tore very easily.  Some bleeding was encountered. Dissection was continued carefully and the 12 mm balloon port was inserted with CO2 insufflation following however the preperitoneal space inflated initially.  Thus, the balloon port was removed, dissection was continued and the port was replaced properly into the intra-abdominal space..  A 5-30 scope was introduced into the abdomen after insufflation  was achieved to a setting of 15 mmHg with carbon dioxide.  All 4 quadrants of the abdomen were identified.  There was no injury to bowel, mesentery or omentum.  A 5 mm lateral port was created lateral to the rectus abdominis muscle with no issues.  A suprapubic 5 mm port was then created, also with no issues.  The 5-30 scope was switched to a 10-30 scope for better visualization. All 4 quadrants of the abdomen were again visualized with multiple adhesions noticed in the pelvis.  There were no significant fluid collections or abscesses noticed.  The right lower quadrant was then the focus of our attention.  The appendix was identified with significant inflammation and was found to be adhered to the right lower quadrant abdominal wall.  The appendix was slowly teased away from the abdominal wall, and adhesions were bluntly dissected.  The tip of the appendix was identified and was curled around itself.  The middle of the appendix was grasped and elevated.  The mesoappendix was identified, and a window was created between the base of the appendix and the mesoappendix using an atraumatic grasper.  Once a sufficient window was created, the mesoappendix was ligated using the E Harmonic.  Next, a blue load Endo-AL stapler was introduced into the abdomen, and the appendix was taken as close to the cecal base as possible without causing injury to the terminal ileum.  The appendix was then placed into an EndoCatch bag.  Before removal of the EndoCatch bag, the right lower quadrant was again observed, and any remaining fluid and any blood was suctioned and irrigated briefly.  The staple line was assessed and appeared to be intact.  Hemostasis was achieved.  The EndoCatch bag was then removed.  The suprapubic and lateral port were then removed under visualization.  All 4 quadrants of the abdomen were again visualized with no remaining fluid collections or blood.  The 12 mm balloon port was then removed.  The fascia of the 12  mm port was attempted to be closed, however, this required multiple 0 Vicryl UR-6 sutures due to tenuous fascia.  No palpable fascial defect was left in the umbilical port.  A running deep dermal suture was placed with 3-0 Vicryl on the infraumbilical incision.  Lastly, all 3 ports were then closed with a 4-0 subcuticular stitch, and Dermabond was placed over all incisions.  The patient was then extubated without any issue.      Dr. Appiah was present for all portions of the procedure.  Instrument and sponge counts were correct x2.          SHANE APPIAH MD       As dictated by ARTURO CORNELL MD            D: 2019   T: 2019   MT:       Name:     CHAZ TORRES   MRN:      [id number]        Account:        [id number]   :      1965           Procedure Date: 2019      Document: [id number]

## 2019-08-16 NOTE — PLAN OF CARE
Status: POD#1 appendectomy  Vitals: VSS  Neuros: Intact  IV: PIV infusing LR at 125 mL/hr. Discontinue when tolerating regular diet.   Resp/trach: LS diminished at bases.   Diet: Regular ordered. Pt needs encouragement to take PO fluids. Had ice chips, popsicle, & water overnight.   Bowel status: Hypoactive BS. Denies nausea.  : Voiding w/o difficulty.   Skin: Abdominal incisions w/ liquid bandage & sutures. Small bruising at sites.  Pain: Incisional pain & soreness. Treated w/ IV dilaudid x2, tylenol, ice. RN taught pt how to splint cough. Axert given for headache.   Activity: Up w/ SBA. Pt ambulated to bathroom.   Social: No visitors overnight.   Plan: Plan to discharge to home today. Continue to monitor & follow POC.

## 2019-08-16 NOTE — PLAN OF CARE
0000  OBS goal:   Patient able to ambulate as they were prior to admission or with assist devices provided by therapies during their stay - not met

## 2019-08-16 NOTE — BRIEF OP NOTE
[facility], [place]    Brief Operative Note    Pre-operative diagnosis: Acute Appendicitis  Post-operative diagnosis * No post-op diagnosis entered *  Procedure: Procedure(s):  APPENDECTOMY, LAPAROSCOPIC, possible Open  Surgeon: Surgeon(s) and Role:     * Shila Appiah MD - Primary     * Pauline Parish MD - Assisting     * Mattie Martinez MD - Assisting  Anesthesia: General   Estimated blood loss: Less than 50 ml  Drains: None  Specimens:   ID Type Source Tests Collected by Time Destination   A : appendix Tissue Appendix SURGICAL PATHOLOGY EXAM Shila Appiah MD 8/15/2019  4:31 PM      Findings:   acutely inflammed appendix, non ruptured, no abscess.  Complications: None.  Implants:  * No implants in log *       Mattie Martinez MD  PGY-2 General Surgery

## 2019-08-16 NOTE — ANESTHESIA POSTPROCEDURE EVALUATION
Anesthesia POST Procedure Evaluation    Patient: Toshia Savage   MRN:     [id number] Gender:   female   Age:    54 year old :      1965        Preoperative Diagnosis: Acute Appendicitis   Procedure(s):  APPENDECTOMY, LAPAROSCOPIC, possible Open   Postop Comments: No value filed.       Anesthesia Type:  Not documented  General    Reportable Event: NO     PAIN: Uncomplicated   Sign Out status: Comfortable, Well controlled pain     PONV: No PONV   Sign Out status:  No Nausea or Vomiting     Neuro/Psych: Uneventful perioperative course   Sign Out Status: Preoperative baseline; Age appropriate mentation     Airway/Resp.: Uneventful perioperative course   Sign Out Status: Non labored breathing, age appropriate RR; Resp. Status within EXPECTED Parameters     CV: Uneventful perioperative course   Sign Out status: Appropriate BP and perfusion indices; Appropriate HR/Rhythm     Disposition:   Sign Out in:  PACU  Disposition:  Phase II; Home  Recovery Course: Uneventful  Follow-Up: Not required           Last Anesthesia Record Vitals:  CRNA VITALS  8/15/2019 1809 - 8/15/2019 1909      8/15/2019             EKG:  Sinus rhythm          Last PACU Vitals:  Vitals Value Taken Time   /87 8/15/2019  9:10 PM   Temp 36.8  C (98.3  F) 8/15/2019  8:30 PM   Pulse 104 8/15/2019  9:10 PM   Resp 11 8/15/2019  9:00 PM   SpO2 97 % 8/15/2019  9:11 PM   Temp src     NIBP     Pulse     SpO2     Resp     Temp     Ht Rate     Temp 2     Vitals shown include unvalidated device data.      Electronically Signed By: Eufemia Omalley MD, August 15, 2019, 9:19 PM

## 2019-08-16 NOTE — OR NURSING
Paged Pauline Parish to update the pt's brother Tomasz. I called him and he had not heard from the surgical team.

## 2019-08-16 NOTE — PLAN OF CARE
0400   OBS goal:  Patient able to ambulate as they were prior to admission or with assist devices provided by therapies during their stay - not met

## 2019-08-16 NOTE — PROGRESS NOTES
"POST OP CHECK  08/15/2019    Toshia Savage is a 54 year old female with h/o appendicitis now POD #0 s/p laparoscopic appendectomy.    Pt reports pain is controlled. No new issues.  No CP, SOB, N/V.      /74 (BP Location: Right arm)   Pulse 97   Temp 98  F (36.7  C) (Oral)   Resp 14   Ht 1.6 m (5' 3\")   Wt 64.9 kg (143 lb)   SpO2 100%   BMI 25.33 kg/m    General: Alert, interactive, & in NAD  Resp: normal work of breathing   Cardiac: Regular rate; extremities warm   Abdomen: Soft, appropriately tender, nondistended  Incision: c/d/i without erythema, warmth, or discharge.   Extremities: No LE edema or obvious joint abnormalities    EBL <50cc       A/P: No acute post-op issues. Continue plan of care per primary team. Please call with questions.     Taylor Landis MD  Surgery Resident PGY-1  Pg [phone]      "

## 2019-08-16 NOTE — PLAN OF CARE
Outpatient in a Bed discharge goals PRIOR TO DISCHARGE     1) Patient able to ambulate as they were prior to admission or with assist devices provided by therapies during their stay--yes

## 2019-08-16 NOTE — PLAN OF CARE
Status: POD0 of appendectomy.  Vitals: VSS on RA. Refused capno. Continuous pulse ox.  Neuros: Intact.  IV: PIV infusing LR @ 125 mL/hr.  Resp/trach: LS clear.  Diet: Water and ice chips. Advance as tolerated.  Bowel status: No BM this shift. BS+  : Voiding without issue.  Skin: Intact. Abdomen dsg CDI.  Pain: Given prn diluadid x1.  Activity: SBA with gb.  Social: Cousin at bedside, supportive.  Plan: Continue to monitor and follow POC.

## 2019-08-16 NOTE — DISCHARGE SUMMARY
[facility]  Discharge Summary  General Surgery     Toshia Savage MRN# [id number]   YOB: 1965 Age: 54 year old     Date of Admission:  8/15/2019  Date of Discharge::  8/16/2019  Admitting Physician:  Shila Appiah MD  Discharge Physician:  Shila Appiah MD  Primary Care Physician:        Arin Horne          Admission Diagnoses:   Acute appendicitis with localized peritonitis, without perforation, abscess, or gangrene [K35.30]            Discharge Diagnosis:   No discharge information exists for this patient.            Procedures:   Procedure(s):  APPENDECTOMY, LAPAROSCOPIC, possible Open          Consultations:   None          Brief History of Illness:   Mrs. Savage is a 54 year old female with a past medical history of laparoscopic total hysterectomy, hypothyroid, depression, and well-controlled asthma, who developed acute severe diffuse abdominal pain on the night of 8/15/2019. Her pain did not remit, and it became focal to the RLQ. The pain was associated with nausea and vomiting. She presented the the Emergency Department after multiple hours of this pain.            Hospital Course:   Upon arrival to the emergency department, she was given intravenous fluids, pain medication, and antibiotics. She had a leukocytosis and RLQ pain/tenderness, so a CT scan of her abdomen and pelvis with intravenous contrast was obtained, revealing findings consistent with acute appendicitis. Later that same day (8/15/2019), the patient underwent laparoscopic appendectomy, which she tolerated well without immediate complications. She was transferred to the PACU and then to the floor for routine postoperative care. The remainder of her postoperative course was unremarkable. On post-operative day 1, she was tolerating a regualr diet, her pain was well controlled with oral pain medications, she was voiding spontaneously, and ambulating independently. Patient  with follow up with Dr. Appiah in General Surgery clinic in 2 weeks and in 3 months for post-surgery visit.           Imaging Studies:     Results for orders placed or performed during the hospital encounter of 08/15/19   CT Abdomen Pelvis w Contrast     Value    Radiologist flags Inflamed appendix with surrounding inflammatory (Urgent)    Narrative    EXAMINATION: CT ABDOMEN PELVIS W CONTRAST, 8/15/2019 5:24 AM    TECHNIQUE:  Helical CT images from the lung bases through the  symphysis pubis were obtained  with contrast. Contrast dose: 88 ml  isovue 370     COMPARISON: None    HISTORY: Abd pain, unspecified    FINDINGS:    Abdomen and pelvis:   Dense material, possibly in appendicolith, at the origin of the  appendix (series 5 image 282). The appendix is dilated up to 1.2 cm .  There is gas of the appendiceal lumen, there are significant  periappendiceal inflammatory changes, most pronounced distally, near  the tip. There is no adjacent fluid collection to suggest  periappendiceal abscess. There is no definite free air to suggest  perforation.    Scattered hypodensities in the liver, for instance in the right lobe  measuring 1.8 x 1.0 cm (series 5 image 68) compatible with benign  hepatic cysts. Additional subcentimeter hypodensities throughout the  liver are too small to accurately characterize, but likely represent  additional hepatic cysts. No suspicious hepatic lesions. Hepatic  vasculature is grossly patent. The gallbladder is unremarkable. No  intra or extrahepatic biliary ductal dilation. The pancreas is  unremarkable. The main pancreatic duct is not dilated. There is a  focal area of fatty infiltration within the anterior pancreatic head  (series 5 image 187). The spleen is unremarkable. The adrenal glands  are unremarkable. No nephrolithiasis or hydronephrosis. Multiple  pelvic phleboliths. The bladder is unremarkable. Hysterectomy. No  pelvic masses. The small and large bowel are normal caliber. There  is  mild redundancy of the sigmoid colon. Small rounded focus of gross fat  attenuation adjacent to the descending colon (series 2, image 48) the  convincing active inflammation. Findings could relate to prior block  appendagitis.  No free air or fluid in the abdomen. No lymphadenopathy  in the abdomen or pelvis by size criteria.     No abdominal aortic aneurysm. There is mild to moderate  atherosclerotic calcification of the abdominal aorta and iliacs.    Lung bases: Streaky atelectasis in the lung bases right greater than  left. No suspicious pulmonary nodules in the lung bases. There is a  fat-containing hernia involving the posterior left hemidiaphragm.    Bones and soft tissues: Mild degenerative changes of the thoracolumbar  spine. No suspicious osseous lesions.      Impression    IMPRESSION:   Uncomplicated acute appendicitis.    [Urgent Result: Inflamed appendix with surrounding inflammatory  changes concerning for acute appendicitis]    Finding was identified on 8/15/2019 5:28 AM.     Dr. Donohue was contacted by Dr. Rod at 8/15/2019 5:35 AM and  verbalized understanding of the urgent finding.     I have personally reviewed the examination and initial interpretation  and I agree with the findings.    CARY BAUMAN MD              Medications Prior to Admission:     No medications prior to admission.              Discharge Medications:     Discharge Medication List as of 8/16/2019  7:56 AM      START taking these medications    Details   oxyCODONE (ROXICODONE) 5 MG tablet Take 1 tablet (5 mg) by mouth every 6 hours as needed for severe pain, Disp-5 tablet, R-0, Local Print         CONTINUE these medications which have NOT CHANGED    Details   acyclovir (ZOVIRAX) 400 MG tablet Take 1 tablet by mouth 2 times daily. Due to be seen in November.  Needs to be seen b/4 further refills., 400 mg, Oral, 2 TIMES DAILY Starting 11/2/2012, Until Discontinued, Disp-30 tablet, R-0, E-Prescribe      acyclovir (ZOVIRAX) 5  % cream Apply  topically 5 times daily.Topical, 5 TIMES DAILY Starting 11/8/2011, Until Discontinued, Disp-2 g, [id number]      albuterol (PROVENTIL HFA: VENTOLIN HFA) 108 (90 BASE) MCG/ACT inhaler Inhale 2 puffs into the lungs every 6 hours as needed for shortness of breath / dyspnea., 2 puff, Inhalation, EVERY 6 HOURS PRN Starting 12/27/2012, Until Discontinued, Disp-1 Inhaler, R-1, E-Prescribe      almotriptan (AXERT) 12.5 MG tablet Take 12.5 mg by mouth daily as needed for migraine (May repeat in 2 hours if needed), Historical      cholecalciferol (VITAMIN D3) 5000 units (125 mcg) capsule Take 5,000 Units by mouth daily, Historical      clindamycin (CLEOCIN T) 1 % external solution Apply topically daily as needed (to face for acne)Historical      cloNIDine (CATAPRES) 0.1 MG tablet Take 0.1 mg by mouth 3 times daily, Historical      Cyanocobalamin (VITAMIN B 12 PO) Take 1 tablet by mouth daily, Historical      cyclobenzaprine (FLEXERIL) 10 MG tablet Take 10 mg by mouth At Bedtime, Historical      diazepam (VALIUM) 2 MG tablet Take 2 mg by mouth every 12 hours as needed for anxiety, Historical      ezetimibe (ZETIA) 10 MG tablet Take 10 mg by mouth daily, Historical      lamoTRIgine (LAMICTAL) 25 MG tablet Take 50 mg by mouth every morning, Historical      levothyroxine (SYNTHROID/LEVOTHROID) 112 MCG tablet Take 112 mcg by mouth every morning (Brand name Synthroid only), Historical      losartan (COZAAR) 25 MG tablet Take 25 mg by mouth daily, Historical      milnacipran (SAVELLA) 50 MG TABS Take 50 mg by mouth 2 times daily , Historical      multivitamin, therapeutic (THERA-VIT) TABS tablet Take 1 tablet by mouth daily, Historical      naproxen (NAPROSYN) 250 MG tablet Take 250 mg by mouth 2 times daily (with meals), Historical      olopatadine (PATANOL) 0.1 % ophthalmic solution Place 1 drop into both eyes 2 times daily as needed for allergies, Historical      prochlorperazine (COMPAZINE) 10 MG tablet Take  10 mg by mouth as needed for other (migraine with nausea/vomiting) , Historical      rizatriptan (MAXALT-MLT) 10 MG ODT Take 10 mg by mouth at onset of headache for migraine, Historical      traMADol (ULTRAM-ER) 100 MG 24 hr tablet Take 100 mg by mouth 2 times daily, Historical      vilazodone (VIIBRYD) 10 MG TABS tablet Take 10 mg by mouth At Bedtime, Historical      ZOLMitriptan (ZOMIG) 5 MG nasal spray Spray 1 spray in nostril at onset of headache for migraine (May repeat in 2 hours if needed), Historical                     Medications Discontinued or Adjusted During This Hospitalization:   No change           Antibiotics Prescribed at Discharge:   None prescribed           Day of Discharge Physical Exam:   Temp:  [97.6  F (36.4  C)-99.2  F (37.3  C)] 99.2  F (37.3  C)  Pulse:  [] 106  Heart Rate:  [] 100  Resp:  [10-21] 16  BP: (105-140)/(67-91) 136/86  SpO2:  [95 %-100 %] 98 %    General: awake, alert, no acute distress, laying comfortably in bed   CV: warm, well perfused   Pulm: breathing comfortably on room air   Abdomen: soft, non-distended, appropriately tender, no rebound or guarding;  Incisions c/d/i   Extremities: no edema, moving all extremities spontaneously and without apparent deficit            Final Pathology Result:   N/A           Discharge Instructions and Follow-Up:     Discharge Procedure Orders   No lifting    Order Comments: No lifting over 20 lbs and no strenuous physical activity for 4 weeks     Diet Instructions   Order Comments: Resume pre-procedure diet     Shower   Order Comments: No shower for 24 hours post procedure. May shower Postoperative Day (POD)  1     Follow Up ([facility])   Order Comments: Follow up with Dr. Appiah , at [address] [place] [address] surgery clinic, within 2 weeks to evaluate after surgery. No follow up labs or test are needed. If feeling well, please call and let us know and you do not need to visit the clinic in person.     We will also schedule a  follow up with Dr. Appiah in 3 months after surgery.    Appointments on [place] and/or [facility] (with [facility] or [facility] provider or service). Call [phone] if you haven't heard regarding these appointments within 7 days of discharge.              Home Health Care:     Not needed           Discharge Disposition:     Discharged to home      Condition at discharge: Stable    Patient was seen, examined, and discussed on day of discharge with chief resident, who discussed with staff surgeon    Travis Hernandez MD  PGY-1, General Surgery

## 2019-08-19 ENCOUNTER — PATIENT OUTREACH (OUTPATIENT)
Dept: SURGERY | Facility: CLINIC | Age: 54
End: 2019-08-19

## 2019-08-19 NOTE — PROGRESS NOTES
Toshia Savage is a patient of Dr. Shane Appiah that underwent a Laparoscopic appendectomy approximately 4 days ago.  Attempted to contact patient via telephone for a status update and review post op teaching.  LM on VM to call office.  Await return call.      Of note:  Pathology:  See below  Wound:  Skin Sealant  Follow-up:  Routine  Restrictions:  - No lifting, pushing, pulling more than 15-20 pounds for 3-4 weeks  New medications:  Oxycodone  Equipment/Supplies:  None      Patient Name: TOSHIA SAVAGE   MR#: [id number]   Specimen #: [id number]   Collected: 8/15/2019   Received: 8/15/2019   Reported: 8/20/2019 14:41   Ordering Phy(s): SHANE APPIAH     For improved result formatting, select 'View Enhanced Report Format' under    Linked Documents section.     SPECIMEN(S):   Appendix     FINAL DIAGNOSIS:   APPENDIX, APPENDECTOMY:   - Acute appendicitis     I have personally reviewed all specimens and/or slides, including the   listed special stains, and used them   with my medical judgement to determine or confirm the final diagnosis.     Electronically signed out by:     Magalis Brooks M.D., [place]

## 2019-08-20 LAB — COPATH REPORT: NORMAL

## 2019-08-22 ENCOUNTER — APPOINTMENT (OUTPATIENT)
Age: 54
Setting detail: DERMATOLOGY
End: 2019-08-23

## 2019-08-22 VITALS — WEIGHT: 142 LBS | HEIGHT: 63 IN | RESPIRATION RATE: 14 BRPM

## 2019-08-22 DIAGNOSIS — R23.1 PALLOR: ICD-10-CM

## 2019-08-22 DIAGNOSIS — I83.9 ASYMPTOMATIC VARICOSE VEINS OF LOWER EXTREMITIES: ICD-10-CM

## 2019-08-22 DIAGNOSIS — L81.4 OTHER MELANIN HYPERPIGMENTATION: ICD-10-CM

## 2019-08-22 DIAGNOSIS — L82.1 OTHER SEBORRHEIC KERATOSIS: ICD-10-CM

## 2019-08-22 DIAGNOSIS — L30.9 DERMATITIS, UNSPECIFIED: ICD-10-CM

## 2019-08-22 DIAGNOSIS — L72.0 EPIDERMAL CYST: ICD-10-CM

## 2019-08-22 PROBLEM — I83.93 ASYMPTOMATIC VARICOSE VEINS OF BILATERAL LOWER EXTREMITIES: Status: ACTIVE | Noted: 2019-08-22

## 2019-08-22 PROCEDURE — 99214 OFFICE O/P EST MOD 30 MIN: CPT | Mod: 25

## 2019-08-22 PROCEDURE — OTHER PRESCRIPTION: OTHER

## 2019-08-22 PROCEDURE — OTHER ORDER TESTS: OTHER

## 2019-08-22 PROCEDURE — OTHER COUNSELING: OTHER

## 2019-08-22 PROCEDURE — OTHER SUNSCREEN RECOMMENDATIONS: OTHER

## 2019-08-22 PROCEDURE — 36415 COLL VENOUS BLD VENIPUNCTURE: CPT

## 2019-08-22 PROCEDURE — OTHER VENIPUNCTURE: OTHER

## 2019-08-22 RX ORDER — TRIAMCINOLONE ACETONIDE 1 MG/G
CREAM TOPICAL BID
Qty: 1 | Refills: 1 | Status: ERX | COMMUNITY
Start: 2019-08-22

## 2019-08-22 ASSESSMENT — LOCATION DETAILED DESCRIPTION DERM
LOCATION DETAILED: LEFT VENTRAL DISTAL FOREARM
LOCATION DETAILED: LEFT ANTECUBITAL SKIN
LOCATION DETAILED: RIGHT SUPERIOR MEDIAL UPPER BACK
LOCATION DETAILED: LEFT CENTRAL MALAR CHEEK
LOCATION DETAILED: RIGHT ANTERIOR DISTAL THIGH
LOCATION DETAILED: RIGHT PROXIMAL PRETIBIAL REGION
LOCATION DETAILED: LEFT PROXIMAL PRETIBIAL REGION
LOCATION DETAILED: UPPER STERNUM
LOCATION DETAILED: LEFT ANTERIOR DISTAL THIGH
LOCATION DETAILED: RIGHT VENTRAL PROXIMAL FOREARM

## 2019-08-22 ASSESSMENT — LOCATION SIMPLE DESCRIPTION DERM
LOCATION SIMPLE: CHEST
LOCATION SIMPLE: LEFT PRETIBIAL REGION
LOCATION SIMPLE: LEFT UPPER ARM
LOCATION SIMPLE: RIGHT FOREARM
LOCATION SIMPLE: LEFT THIGH
LOCATION SIMPLE: LEFT CHEEK
LOCATION SIMPLE: RIGHT THIGH
LOCATION SIMPLE: RIGHT UPPER BACK
LOCATION SIMPLE: RIGHT PRETIBIAL REGION
LOCATION SIMPLE: LEFT FOREARM

## 2019-08-22 ASSESSMENT — LOCATION ZONE DERM
LOCATION ZONE: FACE
LOCATION ZONE: ARM
LOCATION ZONE: LEG
LOCATION ZONE: TRUNK

## 2019-08-22 NOTE — HPI: RASH
Is This A New Presentation, Or A Follow-Up?: Rash
Additional History: This started 3 years ago. She denies history of autoimmunity.

## 2019-08-22 NOTE — PROCEDURE: COUNSELING
Detail Level: Detailed
Detail Level: Simple
Detail Level: Zone
Patient Specific Counseling (Will Not Stick From Patient To Patient): Will draw WILLY lab today
Patient Specific Counseling (Will Not Stick From Patient To Patient): Patient reports that this improves with triamcinolone 0.1% cream after one or two applications, and records periodically. Recommended that she restart this and use as needed. Patient expressed understanding.
Patient Specific Counseling (Will Not Stick From Patient To Patient): She may consider surgical excision

## 2019-08-22 NOTE — PROCEDURE: VENIPUNCTURE
Bill For Individual Tests Below?: no
Venipuncture Paragraph: - An alcohol pad was applied to the venipuncture site. \\n- Venipuncture was performed using a butterfly needle. \\n- Pressure and a band-aid was applied to the site. \\n- No complications were noted.
Detail Level: None
Number Of Tubes Drawn: 1

## 2019-08-22 NOTE — PROGRESS NOTES
RN Post-Op/Post-Discharge Care Coordination Note    Ms. Toshia Savage is a 54 year old female who underwent laparoscopic appendectomy on 8/15 with  Dr. Shila Appiah.  Spoke with Patient.    Support  Patient able to care for self independently     Health Status  Fevers/chills: Patient denies any fever or chills.  Nausea/Vomiting: Patient reports a lack of appetite.  Eating/drinking: Patient is able to eat and drink without any complaints.  Bowel habits: Patient reports having loose stool. Patient was seen by her PCP for this and per patient, she was diagnosed with cdiff. She has been on antibiotics since Monday and she states her stools are less frequent.  Drains (EUNICE): N/A  Incisions: patient states she had some redness and warmth to her umbilical incision. She was seen by her PCP and was prescribed Keflex for a possible infection. Patient has been taking this since Monday. Wound closure:  Skin Sealant  Pain: patient had previously used Tramadol BID for fibromyalgia. She is also taking Naproxen BID when she is sore. Discussed she can increase this to TID prn if needed.   New Medications:  Oxycodone    Activity/Restrictions  No lifting in excess of 15-20 pounds for 3-4 weeks    Equipment  None    Pathology reviewed with patient:  Yes: reviewed    Forms/Letters  No    All of her questions were answered including reviewing restrictions, pathology, and wound care.  She will call this office if she has any further questions and/or concerns.      Patient is requesting a post op appointment for a wound check. She is scheduled with Dr. Haskins 8/29 at 2:30.    A copy of this note was routed to the primary surgeon.      Whom and When to Call  Patient acknowledges understanding of how to manage any medication changes and   when to seek medical care.     Patient advised that if after hour medical concerns arise to please call [phone] and choose option 4 to speak to the physician on call.

## 2019-08-26 ENCOUNTER — DOCUMENTATION ONLY (OUTPATIENT)
Dept: CARE COORDINATION | Facility: CLINIC | Age: 54
End: 2019-08-26

## 2019-08-27 ENCOUNTER — TELEPHONE (OUTPATIENT)
Dept: SURGERY | Facility: CLINIC | Age: 54
End: 2019-08-27

## 2019-08-27 NOTE — TELEPHONE ENCOUNTER
Established Patient Telephone Reminder Call    Date of call:  08/27/19  Phone numbers:  Home number on file [phone] (home)    Reached patient/confirmed appointment:  No - left message:   on voicemail  Appointment with:   Dr. Javid Haskins  Reason for visit:  Post op

## 2019-08-29 ENCOUNTER — OFFICE VISIT (OUTPATIENT)
Dept: SURGERY | Facility: CLINIC | Age: 54
End: 2019-08-29
Payer: COMMERCIAL

## 2019-08-29 VITALS
WEIGHT: 142 LBS | DIASTOLIC BLOOD PRESSURE: 95 MMHG | TEMPERATURE: 98 F | OXYGEN SATURATION: 99 % | SYSTOLIC BLOOD PRESSURE: 137 MMHG | BODY MASS INDEX: 25.16 KG/M2 | HEIGHT: 63 IN | HEART RATE: 107 BPM

## 2019-08-29 DIAGNOSIS — Z98.890 POSTOPERATIVE STATE: Primary | ICD-10-CM

## 2019-08-29 ASSESSMENT — PAIN SCALES - GENERAL: PAINLEVEL: MODERATE PAIN (5)

## 2019-08-29 ASSESSMENT — MIFFLIN-ST. JEOR: SCORE: 1208.24

## 2019-08-29 NOTE — LETTER
8/29/2019       RE: Toshia Savage  [address] [address]     Dear Colleague,    Thank you for referring your patient, Toshia Savage, to the [facility] GENERAL SURGERY at [facility]. Please see a copy of my visit note below.    Toshia Savage is status post:  Lap appy with MARY.  Surgery without complications.  Post-op course without complications. Still some abdominal soreness.  Incisions examined, no signs of infection.  All of the patients questions were answered.  Standard post-op instructions and restrictions given.  Follow-up with me on a PRN basis.    Again, thank you for allowing me to participate in the care of your patient.      Sincerely,    Javid Haskins MD

## 2019-08-29 NOTE — NURSING NOTE
"Chief Complaint   Patient presents with     Surgical Followup     post op appy f/u       Vitals:    08/29/19 1422   BP: (!) 137/95   BP Location: Left arm   Patient Position: Chair   Cuff Size: Adult Regular   Pulse: 107   Temp: 98  F (36.7  C)   TempSrc: Oral   SpO2: 99%   Weight: 64.4 kg (142 lb)   Height: 1.6 m (5' 3\")       Body mass index is 25.15 kg/m .    Quinn Hoyt, EMT    "

## 2019-08-29 NOTE — PROGRESS NOTES
Toshia Savage is status post:  Lap appy with MARY.  Surgery without complications.  Post-op course without complications. Still some abdominal soreness.  Incisions examined, no signs of infection.  All of the patients questions were answered.  Standard post-op instructions and restrictions given.  Follow-up with me on a PRN basis.

## 2019-09-30 ENCOUNTER — HEALTH MAINTENANCE LETTER (OUTPATIENT)
Age: 54
End: 2019-09-30

## 2020-01-17 ENCOUNTER — APPOINTMENT (OUTPATIENT)
Age: 55
Setting detail: DERMATOLOGY
End: 2020-01-17

## 2020-01-17 VITALS — HEIGHT: 63 IN | WEIGHT: 139 LBS | RESPIRATION RATE: 16 BRPM

## 2020-01-17 DIAGNOSIS — L663 OTHER SPECIFIED DISEASES OF HAIR AND HAIR FOLLICLES: ICD-10-CM

## 2020-01-17 DIAGNOSIS — L738 OTHER SPECIFIED DISEASES OF HAIR AND HAIR FOLLICLES: ICD-10-CM

## 2020-01-17 PROBLEM — L30.9 DERMATITIS, UNSPECIFIED: Status: ACTIVE | Noted: 2020-01-17

## 2020-01-17 PROCEDURE — OTHER PRESCRIPTION: OTHER

## 2020-01-17 PROCEDURE — OTHER COUNSELING: OTHER

## 2020-01-17 PROCEDURE — 99214 OFFICE O/P EST MOD 30 MIN: CPT

## 2020-01-17 RX ORDER — DAPSONE 50 MG/G
5% GEL TOPICAL BID
Qty: 1 | Refills: 11 | Status: ERX | COMMUNITY
Start: 2020-01-17

## 2020-01-17 ASSESSMENT — LOCATION DETAILED DESCRIPTION DERM
LOCATION DETAILED: RIGHT INFERIOR CENTRAL MALAR CHEEK
LOCATION DETAILED: LEFT INFERIOR CENTRAL MALAR CHEEK

## 2020-01-17 ASSESSMENT — LOCATION ZONE DERM: LOCATION ZONE: FACE

## 2020-01-17 ASSESSMENT — LOCATION SIMPLE DESCRIPTION DERM
LOCATION SIMPLE: LEFT CHEEK
LOCATION SIMPLE: RIGHT CHEEK

## 2020-01-17 NOTE — PROCEDURE: COUNSELING
Patient Specific Counseling (Will Not Stick From Patient To Patient): Discussed hsv vs folliclulitis. Regarding her history of hsv does report having stomach upset when treating flares with acyclovir. Recommended tiral of valtrex 500mg qd. Take 2 g at first tingle then 2 g 12 hours later for flares only. Patient expressed understanding. Follow-up annually for recheck. Advised that since it has been going on for over a week, there is likely no value in shooting with acyclovir today. Patient also has a history of acne. Advised that this could be an acne-like problem. Recommended treating ths problem and acne with dapsone gel 5% via ASPN. Patient expressed understanding. Follow up in two weeks if not dramatically improved. Patient expressed understanding.
Detail Level: Simple

## 2020-01-17 NOTE — HPI: RASH
How Severe Is Your Rash?: moderate
Is This A New Presentation, Or A Follow-Up?: Rash
Additional History: Has a history of cold sores. Gets cold sores 2-3 per year since being on acyclovir bid. Was getting sores monthly previosuly. She took a higher dose for 4 days and this did not help. Started like blisters. Uses clindamycin qod for adult acne.

## 2020-02-03 ENCOUNTER — TRANSFERRED RECORDS (OUTPATIENT)
Dept: HEALTH INFORMATION MANAGEMENT | Facility: CLINIC | Age: 55
End: 2020-02-03

## 2020-02-25 ENCOUNTER — TELEPHONE (OUTPATIENT)
Dept: PSYCHIATRY | Facility: CLINIC | Age: 55
End: 2020-02-25

## 2020-02-25 NOTE — TELEPHONE ENCOUNTER
PSYCHIATRY CLINIC PHONE INTAKE     SERVICES REQUESTED / INTERESTED IN          Med Management    Presenting Problem and Brief History                              What would you like to be seen for? (brief description):  Pt was recommended by therapist to find a new psychiatry provider.Pt was diagnosed 25 years ago. Medications on file are up to date. Pt stated she never felt the viibrid ever helped and she's been taking it for a year. She has had this happen before where she will take a medication and it stops working after awhile. She has issues falling asleep, and stays awake until the early AM. She notices symptoms of depression and anxiety are present. Her mother passes away last May. She sees a grief therapist as well as an individual therapist at [facility]. 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 Two weeks later she tore a muscle in foot, and since then has had multiple medical issues back to back. She's really struggling. She knows that the grief is adding to her symptoms of anxiety and depression at this time. Her PCP left their practice, and right now she has a psychiatrist helping with medications, but pt feels dissatisfied with the services.     Have you received a mental health diagnosis? Yes   Which one (s): Depression and anxiety  Is there any history of developmental delay?  No   Are you currently seeing a mental health provider?  Yes            Who / month last seen:  Kaycee Arshad, psychiatrist at [facility] in [facility] and Grief Therapist and group therapy at [facility]. Individual Therapist - [facility](private) in [place].  Do you have mental health records elsewhere?  Yes  Will you sign a release so we can obtain them?  Yes    Have you ever been hospitalized for psychiatric reasons?  Yes  Describe:  Self-check-in into [facility] due to SI 15 years ago.     Do you have current thoughts of self-harm?  Yes - She has thoughts of wanting to be dead, but doesn't have intent     Do you currently have thoughts of harming others?  No       Substance Use History     Do you have any history of alcohol / illicit drug use?  No  Describe:  NA  Have you ever received treatment for this?  No    Describe:  /NA     Social History     Who is the patient's a guardian?  No    Name / number: NA  Have you had an ACT team in last 12 months?  No  Describe: NA   Do you have any current or past legal issues?  No  Describe: NA   OK to leave a detailed voicemail?  Yes    Medical/ Surgical History                                   Patient Active Problem List   Diagnosis     Herpes labialis     Migraine     Fibromyalgia syndrome     Hypothyroidism     Depression with anxiety     Raynaud phenomenon     CARDIOVASCULAR SCREENING; LDL GOAL LESS THAN 160     Colonic polyp     Diverticulosis of colon     GERD (gastroesophageal reflux disease)     Exercise-induced asthma     Tobacco abuse     Appendicitis          Medications             Current Outpatient Medications   Medication Sig Dispense Refill     acyclovir (ZOVIRAX) 400 MG tablet Take 1 tablet by mouth 2 times daily. Due to be seen in November.  Needs to be seen b/4 further refills. 30 tablet 0     acyclovir (ZOVIRAX) 5 % cream Apply  topically 5 times daily. 2 g 3     albuterol (PROVENTIL HFA: VENTOLIN HFA) 108 (90 BASE) MCG/ACT inhaler Inhale 2 puffs into the lungs every 6 hours as needed for shortness of breath / dyspnea. 1 Inhaler 1     almotriptan (AXERT) 12.5 MG tablet Take 12.5 mg by mouth daily as needed for migraine (May repeat in 2 hours if needed)       cholecalciferol (VITAMIN D3) 5000 units (125 mcg) capsule Take 5,000 Units by mouth daily       clindamycin (CLEOCIN T) 1 % external solution Apply topically daily as needed (to face for acne)       cloNIDine (CATAPRES) 0.1 MG tablet Take 0.1 mg by mouth 3 times daily       Cyanocobalamin (VITAMIN B 12 PO) Take 1 tablet by mouth daily       cyclobenzaprine (FLEXERIL) 10 MG tablet Take 10 mg by mouth  At Bedtime       diazepam (VALIUM) 2 MG tablet Take 2 mg by mouth every 12 hours as needed for anxiety       ezetimibe (ZETIA) 10 MG tablet Take 10 mg by mouth daily       lamoTRIgine (LAMICTAL) 25 MG tablet Take 50 mg by mouth every morning       levothyroxine (SYNTHROID/LEVOTHROID) 112 MCG tablet Take 112 mcg by mouth every morning (Brand name Synthroid only)       losartan (COZAAR) 25 MG tablet Take 25 mg by mouth daily       milnacipran (SAVELLA) 50 MG TABS Take 50 mg by mouth 2 times daily        multivitamin, therapeutic (THERA-VIT) TABS tablet Take 1 tablet by mouth daily       naproxen (NAPROSYN) 250 MG tablet Take 250 mg by mouth 2 times daily (with meals)       olopatadine (PATANOL) 0.1 % ophthalmic solution Place 1 drop into both eyes 2 times daily as needed for allergies       prochlorperazine (COMPAZINE) 10 MG tablet Take 10 mg by mouth as needed for other (migraine with nausea/vomiting)        rizatriptan (MAXALT-MLT) 10 MG ODT Take 10 mg by mouth at onset of headache for migraine       traMADol (ULTRAM-ER) 100 MG 24 hr tablet Take 100 mg by mouth 2 times daily       vilazodone (VIIBRYD) 10 MG TABS tablet Take 10 mg by mouth At Bedtime       ZOLMitriptan (ZOMIG) 5 MG nasal spray Spray 1 spray in nostril at onset of headache for migraine (May repeat in 2 hours if needed)           DISPOSITION      2/25/20 Intake complete. Adding to resident /NP GERARDO Chauhan,

## 2020-02-26 ENCOUNTER — APPOINTMENT (OUTPATIENT)
Age: 55
Setting detail: DERMATOLOGY
End: 2020-02-28

## 2020-02-26 VITALS — HEIGHT: 63 IN | WEIGHT: 139 LBS | RESPIRATION RATE: 18 BRPM

## 2020-02-26 DIAGNOSIS — L71.8 OTHER ROSACEA: ICD-10-CM

## 2020-02-26 DIAGNOSIS — R21 RASH AND OTHER NONSPECIFIC SKIN ERUPTION: ICD-10-CM

## 2020-02-26 PROCEDURE — OTHER PRESCRIPTION SAMPLES PROVIDED: OTHER

## 2020-02-26 PROCEDURE — 99213 OFFICE O/P EST LOW 20 MIN: CPT

## 2020-02-26 PROCEDURE — OTHER COUNSELING: OTHER

## 2020-02-26 PROCEDURE — OTHER PRESCRIPTION: OTHER

## 2020-02-26 RX ORDER — PIMECROLIMUS 10 MG/G
1% CREAM TOPICAL BID
Qty: 1 | Refills: 1 | Status: ERX | COMMUNITY
Start: 2020-02-26

## 2020-02-26 ASSESSMENT — LOCATION DETAILED DESCRIPTION DERM
LOCATION DETAILED: LEFT LATERAL MALAR CHEEK
LOCATION DETAILED: RIGHT SUPERIOR LATERAL NECK

## 2020-02-26 ASSESSMENT — LOCATION ZONE DERM
LOCATION ZONE: NECK
LOCATION ZONE: FACE

## 2020-02-26 ASSESSMENT — LOCATION SIMPLE DESCRIPTION DERM
LOCATION SIMPLE: RIGHT ANTERIOR NECK
LOCATION SIMPLE: LEFT CHEEK

## 2020-02-26 NOTE — PROCEDURE: COUNSELING
Detail Level: Detailed
Patient Specific Counseling (Will Not Stick From Patient To Patient): SUSPECT IRRITANT DERMATITIS \\n\\n-D/C Arslan for now, explained that Arslan has retinol within it, could be causing irritation \\n-Discussed that topical steroids can worsen rosacea, therefore recommend elidel

## 2020-02-26 NOTE — HPI: RASH
What Type Of Note Output Would You Prefer (Optional)?: Bullet Format
How Severe Is Your Rash?: mild
Is This A New Presentation, Or A Follow-Up?: Rash
Additional History: Arslan, [place], witch Hazel for toner

## 2020-03-18 ENCOUNTER — APPOINTMENT (OUTPATIENT)
Age: 55
Setting detail: DERMATOLOGY
End: 2020-03-18

## 2020-03-18 VITALS — HEIGHT: 63 IN | WEIGHT: 138 LBS | RESPIRATION RATE: 16 BRPM

## 2020-03-18 PROBLEM — R21 RASH AND OTHER NONSPECIFIC SKIN ERUPTION: Status: ACTIVE | Noted: 2020-03-18

## 2020-03-18 PROCEDURE — OTHER COUNSELING: OTHER

## 2020-03-18 PROCEDURE — OTHER PRESCRIPTION: OTHER

## 2020-03-18 PROCEDURE — 99213 OFFICE O/P EST LOW 20 MIN: CPT

## 2020-03-18 NOTE — PROCEDURE: COUNSELING
Detail Level: Zone
Patient Specific Counseling (Will Not Stick From Patient To Patient): Discussed rosacea vs steroid acne/folliculitis due to possibly her kenalog injections she gets every 3 months. This may worsen or be resistant to treatment if she continues to get these injections. Recommended low dose cephalexin since doxycycline was poorly tolerated. Recommended restarting dapsone gel bid. Take fluconazole as needed should yeast infection develop.

## 2020-03-18 NOTE — HPI: RASH
How Severe Is Your Rash?: mild
Is This A New Presentation, Or A Follow-Up?: Rash
Additional History: Stopped doxycycline after 8 days due to upset stomach. Tried with food and no help. Elidel causes stinging and burning and signifciant redness.  Used dapsone for 2-3 weeks and this did not help so she stopped. Used an organic product for 2 weeks and this does not help either. She scratches at these because they are itchy. Recently has cortiosne injection in [facility] and was told she cant do this again until may. These steroid injections were done 6-7 weeks ago. She normally gets these steroid injections every 3 months.

## 2020-05-20 ENCOUNTER — APPOINTMENT (OUTPATIENT)
Age: 55
Setting detail: DERMATOLOGY
End: 2020-05-20

## 2020-05-20 VITALS — RESPIRATION RATE: 18 BRPM | WEIGHT: 138 LBS | HEIGHT: 61 IN

## 2020-05-20 DIAGNOSIS — L82.1 OTHER SEBORRHEIC KERATOSIS: ICD-10-CM

## 2020-05-20 DIAGNOSIS — R21 RASH AND OTHER NONSPECIFIC SKIN ERUPTION: ICD-10-CM

## 2020-05-20 DIAGNOSIS — L71.8 OTHER ROSACEA: ICD-10-CM

## 2020-05-20 PROCEDURE — 99214 OFFICE O/P EST MOD 30 MIN: CPT

## 2020-05-20 PROCEDURE — OTHER PRESCRIPTION: OTHER

## 2020-05-20 PROCEDURE — OTHER COUNSELING: OTHER

## 2020-05-20 RX ORDER — TRIAMCINOLONE ACETONIDE 5 MG/G
0.5% OINTMENT TOPICAL BID
Qty: 2 | Refills: 1 | Status: ERX | COMMUNITY
Start: 2020-05-20

## 2020-05-20 ASSESSMENT — LOCATION SIMPLE DESCRIPTION DERM
LOCATION SIMPLE: ABDOMEN
LOCATION SIMPLE: RIGHT UPPER BACK
LOCATION SIMPLE: RIGHT LOWER BACK
LOCATION SIMPLE: LEFT CHEEK

## 2020-05-20 ASSESSMENT — LOCATION ZONE DERM
LOCATION ZONE: TRUNK
LOCATION ZONE: FACE

## 2020-05-20 ASSESSMENT — LOCATION DETAILED DESCRIPTION DERM
LOCATION DETAILED: RIGHT SUPERIOR MEDIAL UPPER BACK
LOCATION DETAILED: RIGHT SUPERIOR MEDIAL MIDBACK
LOCATION DETAILED: LEFT INFERIOR CENTRAL MALAR CHEEK
LOCATION DETAILED: RIGHT RIB CAGE

## 2020-05-20 NOTE — HPI: RASH
How Severe Is Your Rash?: severe
Is This A New Presentation, Or A Follow-Up?: Rash
Additional History: Itch does not keep her up at night, thinks is may be from ultrasound gel or lilac bushes.    Has tried topical and oral benadryl and both pf these help. Never had a rash like this before.

## 2020-05-20 NOTE — PROCEDURE: COUNSELING
Detail Level: Zone
Patient Specific Counseling (Will Not Stick From Patient To Patient): Patient will continue with Aargon Oil as she found it to be more theraputic than the topicals we prescribed and oral cephalexin. \\nPatient also suspects that since she hasn't had localized steroid injections in 5 months, her acne and rosacea could be improving. Patient getting these injections again tomorrow, will see if her acne flares again after.
Patient Specific Counseling (Will Not Stick From Patient To Patient): Lesion is asymptomatic
Detail Level: Simple
Patient Specific Counseling (Will Not Stick From Patient To Patient): Differential: suspect eczema vs folliculitis vs allergic contact dermatitis\\nWill treat as eczema today with topical steroids. No intact pustules obseved today. If not resolved it for two weeks, then return to clinic. If she is able to isolate a new spot that has not been scratched with a Band-Aid, then try to do this. Patient expressed understanding.
Patient Specific Counseling (Will Not Stick From Patient To Patient): Patient report sdapsone gel caused dryness so she stopped this. Nothing has worked other than argon oil. She is happy with argon oil. Discussed possibility this was related to previosu localized steroid injections (steroid acne). She is getting more of these soon so we will see if this causes a triggering of a flare. Would consider finacea if inadequately controlled with argon oil alone.

## 2020-05-22 ENCOUNTER — TRANSFERRED RECORDS (OUTPATIENT)
Dept: HEALTH INFORMATION MANAGEMENT | Facility: CLINIC | Age: 55
End: 2020-05-22

## 2020-08-26 ENCOUNTER — TRANSFERRED RECORDS (OUTPATIENT)
Dept: HEALTH INFORMATION MANAGEMENT | Facility: CLINIC | Age: 55
End: 2020-08-26

## 2020-10-07 ENCOUNTER — RX ONLY (RX ONLY)
Age: 55
End: 2020-10-07

## 2020-10-07 RX ORDER — VALACYCLOVIR 1 G/1
2 G TABLET, FILM COATED ORAL AS DIRECTED
Qty: 20 | Refills: 0 | Status: ERX

## 2020-11-23 ENCOUNTER — OFFICE VISIT (OUTPATIENT)
Dept: FAMILY MEDICINE | Facility: CLINIC | Age: 55
End: 2020-11-23
Payer: COMMERCIAL

## 2020-11-23 VITALS
WEIGHT: 157.4 LBS | DIASTOLIC BLOOD PRESSURE: 82 MMHG | BODY MASS INDEX: 27.88 KG/M2 | TEMPERATURE: 98.1 F | HEART RATE: 90 BPM | SYSTOLIC BLOOD PRESSURE: 116 MMHG

## 2020-11-23 DIAGNOSIS — F41.1 GENERALIZED ANXIETY DISORDER: ICD-10-CM

## 2020-11-23 DIAGNOSIS — F17.200 TOBACCO USE DISORDER: ICD-10-CM

## 2020-11-23 DIAGNOSIS — F33.1 MAJOR DEPRESSIVE DISORDER, RECURRENT, MODERATE (H): ICD-10-CM

## 2020-11-23 DIAGNOSIS — G43.009 MIGRAINE WITHOUT AURA AND WITHOUT STATUS MIGRAINOSUS, NOT INTRACTABLE: ICD-10-CM

## 2020-11-23 DIAGNOSIS — R73.03 PREDIABETES: ICD-10-CM

## 2020-11-23 DIAGNOSIS — M25.561 ACUTE PAIN OF RIGHT KNEE: Primary | ICD-10-CM

## 2020-11-23 DIAGNOSIS — M79.7 FIBROMYALGIA: ICD-10-CM

## 2020-11-23 DIAGNOSIS — E78.5 HYPERLIPIDEMIA LDL GOAL <160: ICD-10-CM

## 2020-11-23 DIAGNOSIS — E03.9 HYPOTHYROIDISM, UNSPECIFIED TYPE: ICD-10-CM

## 2020-11-23 DIAGNOSIS — I10 ESSENTIAL HYPERTENSION: ICD-10-CM

## 2020-11-23 PROBLEM — Z82.0 FAMILY HISTORY OF ALZHEIMER'S DISEASE: Status: ACTIVE | Noted: 2017-11-13

## 2020-11-23 PROBLEM — M65.311 TRIGGER FINGER OF RIGHT THUMB: Status: ACTIVE | Noted: 2018-10-06

## 2020-11-23 PROBLEM — K43.2 INCISIONAL HERNIA, WITHOUT OBSTRUCTION OR GANGRENE: Status: ACTIVE | Noted: 2019-11-11

## 2020-11-23 LAB — HBA1C MFR BLD: 5.4 % (ref 0–5.6)

## 2020-11-23 PROCEDURE — 99000 SPECIMEN HANDLING OFFICE-LAB: CPT | Performed by: FAMILY MEDICINE

## 2020-11-23 PROCEDURE — 80048 BASIC METABOLIC PNL TOTAL CA: CPT | Performed by: FAMILY MEDICINE

## 2020-11-23 PROCEDURE — 80307 DRUG TEST PRSMV CHEM ANLYZR: CPT | Mod: 90 | Performed by: FAMILY MEDICINE

## 2020-11-23 PROCEDURE — 99204 OFFICE O/P NEW MOD 45 MIN: CPT | Performed by: FAMILY MEDICINE

## 2020-11-23 PROCEDURE — 83036 HEMOGLOBIN GLYCOSYLATED A1C: CPT | Performed by: FAMILY MEDICINE

## 2020-11-23 PROCEDURE — 36415 COLL VENOUS BLD VENIPUNCTURE: CPT | Performed by: FAMILY MEDICINE

## 2020-11-23 RX ORDER — LEVOMILNACIPRAN HYDROCHLORIDE 20 MG/1
CAPSULE, EXTENDED RELEASE ORAL
COMMUNITY
Start: 2020-11-10 | End: 2020-12-31 | Stop reason: SINTOL

## 2020-11-23 NOTE — PROGRESS NOTES
Subjective     Toshia Savage is a 55 year old female who presents to clinic today for the following health issues:    HPI         New Patient/Transfer of Care from [facility]    Patient has many chronic illnesses to discuss:    1) Patient is concerned that she may have Cushing's disease.  She is worried about this because she says she looked online and has multiple symptoms of this disease including acne, fatigue, achy joints, foot and leg swelling, and numbness in her fingers.  She also reports having an elevated CO2 level on labs previously, although her most recent labs in October showed a normal CO2 level.  She's had many steroid injections in various locations over the years because of her fibromyalgia and she's concerned that this exogenous steroid use may have caused endocrine problems.      2) She c/o acute right knee pain and swelling.  No known injury.  Was seen at  for this on 11/9.  XRs at that visit showed signs of osteoarthritis.  She was given some home therapy exercises to work on and notes that those are not helping.  She reports pain below and around her patella and sometimes in the back of her knee.  Pain is worst when going up and down stairs.      3) Patient has fibromyalgia.  Diagnosed about 4 years ago.  She follows with neurology ([facility] in [place]).  She takes Savella, Flexeril, and Tramadol.  Cora used to manage all of these medications, but refused to continue her Tramadol once it's schedule classification was raised.  She has since gotten tramadol from her PCP.  She takes 100mg BID and has been on this dose for some time.  Did trial a period off of this medication and states her pain was significantly worse.  She has never been evaluated by a pain clinic before.   checked.  Appears to have appropriate fills for tramadol #60 monthly for at least the last year.  Last Rx was from 9/23/20.  Patient says she will be out of tramadol on Friday (11/28).      4) Patient has a  history of major depression and SUMI.  She is followed by psychiatry at [facility].  Takes Lamictal, Viibryd, and Fetzima.  Previously took Ativan as well, but that was stopped in May 2020.      5) Patient has migraines which are managed by her neurologist.  Takes either Maxalt or Zomig PRN as well as compazine.      6) HTN.  Takes clonidine and losartan.  BPs have been well controlled.     7) HLD: Failed numerous statins 2/2 side effects.  On Zetia now, which has been working well for her.       Review of Systems   Constitutional, HEENT, cardiovascular, pulmonary, GI, , musculoskeletal, neuro, skin, endocrine and psych systems are negative, except as otherwise noted.      Objective    /82 (BP Location: Right arm, Patient Position: Chair, Cuff Size: Adult Regular)   Pulse 90   Temp 98.1  F (36.7  C) (Oral)   Wt 71.4 kg (157 lb 6.4 oz)   BMI 27.88 kg/m    Body mass index is 27.88 kg/m .  Physical Exam   GENERAL: healthy, alert and no distress  EYES: Eyes grossly normal to inspection  RESP: lungs clear to auscultation - no rales, rhonchi or wheezes  CV: regular rates and rhythm, normal S1 S2, no S3 or S4 and no murmur, click or rub  MS: Right knee with a small Baker's cyst and mild TTP along patellar tendon.  Normal ROM, but reports pain with full flexion.  No meniscus signs or evidence of ligament laxity.    SKIN: no suspicious lesions or rashes  NEURO: Normal strength and tone, mentation intact and speech normal  PSYCH: mentation appears normal, affect normal/bright          Assessment & Plan     Acute pain of right knee  - Suspect a combination of OA as well as patellar tendinitis vs patellofemoral syndrome.  She also has a Baker's cyst   - Advised physical therapy and compression with a knee sleeve  - Also referred to [facility]   - [facility]ERR[facility]; Future  - Orthopedic & Spine  Referral; Future    Fibromyalgia  - Patient sees a neurologist at [facility].  Need to get records from  them to review workup/treatment so far  - Advised a [facility] pain management referral before I will take on chronic Rx for tramadol   - UDS today  - PAIN MANAGEMENT REFERRAL; Future  - Drug  Screen Comprehensive, Urine w/o Reported Meds (Pain Care Package)    Migraine without aura and without status migrainosus, not intractable  - Stable  - Managed by neurology     Hypothyroidism, unspecified type  Prediabetes  - Stable  - Patient concerned about possible Cushing's disease.  Due for chronic labs and will also check a urine cortisol   - Basic metabolic panel  (Ca, Cl, CO2, Creat, Gluc, K, Na, BUN)  - CORTISOL, FREE 24 HOUR URINE  - Hemoglobin A1c  - Cortisol Cortisone Free 24 Hour Urine; Future    Essential hypertension  - Stable  - Basic metabolic panel  (Ca, Cl, CO2, Creat, Gluc, K, Na, BUN)  - CORTISOL, FREE 24 HOUR URINE  - Cortisol Cortisone Free 24 Hour Urine; Future    Major depressive disorder, recurrent, moderate (H)  Generalized anxiety disorder  - Stable  - Managed by psych     Tobacco use disorder  - Patient notes she has tried numerous medications, OTC remedies, and hypnosis and failed all     Hyperlipidemia LDL goal <160  - Failed treatment with statins 2/2 side effects  - Currently on Zetia     Follow up pending lab results and notes from [facility].  I am hesitant to continue prescribing her chronic tramadol since narcotics are generally not recommended to treat chronic pain.  However, I am willing to fill it short term until she can be evaluated by the pain clinic to pursue other treatment options.   Again, will also need to review her notes from [facility].    Greater than 50% of this visit was spent counseling face to face regarding the above diagnosis  Visit lasted approximately 40 minutes    DO JESUS Corley [facility]

## 2020-11-24 LAB
ANION GAP SERPL CALCULATED.3IONS-SCNC: 8 MMOL/L (ref 3–14)
BUN SERPL-MCNC: 21 MG/DL (ref 7–30)
CALCIUM SERPL-MCNC: 9.7 MG/DL (ref 8.5–10.1)
CHLORIDE SERPL-SCNC: 105 MMOL/L (ref 94–109)
CO2 SERPL-SCNC: 24 MMOL/L (ref 20–32)
CREAT SERPL-MCNC: 0.88 MG/DL (ref 0.52–1.04)
GFR SERPL CREATININE-BSD FRML MDRD: 73 ML/MIN/{1.73_M2}
GLUCOSE SERPL-MCNC: 93 MG/DL (ref 70–99)
POTASSIUM SERPL-SCNC: 4.4 MMOL/L (ref 3.4–5.3)
SODIUM SERPL-SCNC: 137 MMOL/L (ref 133–144)

## 2020-11-25 ENCOUNTER — OFFICE VISIT (OUTPATIENT)
Dept: ORTHOPEDICS | Facility: CLINIC | Age: 55
End: 2020-11-25
Attending: FAMILY MEDICINE
Payer: COMMERCIAL

## 2020-11-25 ENCOUNTER — THERAPY VISIT (OUTPATIENT)
Dept: PHYSICAL THERAPY | Facility: CLINIC | Age: 55
End: 2020-11-25
Attending: FAMILY MEDICINE
Payer: COMMERCIAL

## 2020-11-25 DIAGNOSIS — M25.561 ACUTE PAIN OF RIGHT KNEE: ICD-10-CM

## 2020-11-25 DIAGNOSIS — M25.461 EFFUSION OF RIGHT KNEE: ICD-10-CM

## 2020-11-25 DIAGNOSIS — I10 ESSENTIAL HYPERTENSION: ICD-10-CM

## 2020-11-25 DIAGNOSIS — M17.11 PRIMARY OSTEOARTHRITIS OF RIGHT KNEE: Primary | ICD-10-CM

## 2020-11-25 DIAGNOSIS — M17.11 PRIMARY OSTEOARTHRITIS OF RIGHT KNEE: ICD-10-CM

## 2020-11-25 DIAGNOSIS — E03.9 HYPOTHYROIDISM, UNSPECIFIED TYPE: ICD-10-CM

## 2020-11-25 DIAGNOSIS — R73.03 PREDIABETES: ICD-10-CM

## 2020-11-25 DIAGNOSIS — M25.552 LEFT HIP PAIN: ICD-10-CM

## 2020-11-25 PROCEDURE — 97035 APP MDLTY 1+ULTRASOUND EA 15: CPT | Mod: GP | Performed by: PHYSICAL THERAPIST

## 2020-11-25 PROCEDURE — 99000 SPECIMEN HANDLING OFFICE-LAB: CPT | Performed by: FAMILY MEDICINE

## 2020-11-25 PROCEDURE — 97140 MANUAL THERAPY 1/> REGIONS: CPT | Mod: GP | Performed by: PHYSICAL THERAPIST

## 2020-11-25 PROCEDURE — 97110 THERAPEUTIC EXERCISES: CPT | Mod: GP | Performed by: PHYSICAL THERAPIST

## 2020-11-25 PROCEDURE — 99203 OFFICE O/P NEW LOW 30 MIN: CPT | Performed by: FAMILY MEDICINE

## 2020-11-25 PROCEDURE — 82530 CORTISOL FREE: CPT | Mod: 90 | Performed by: FAMILY MEDICINE

## 2020-11-25 PROCEDURE — 97161 PT EVAL LOW COMPLEX 20 MIN: CPT | Mod: GP | Performed by: PHYSICAL THERAPIST

## 2020-11-25 PROCEDURE — 82542 COL CHROMOTOGRAPHY QUAL/QUAN: CPT | Mod: 90 | Performed by: FAMILY MEDICINE

## 2020-11-25 ASSESSMENT — ACTIVITIES OF DAILY LIVING (ADL)
RISE FROM A CHAIR: ACTIVITY IS VERY DIFFICULT
KNEEL ON THE FRONT OF YOUR KNEE: I AM UNABLE TO DO THE ACTIVITY
WEAKNESS: THE SYMPTOM AFFECTS MY ACTIVITY SEVERELY
KNEE_ACTIVITY_OF_DAILY_LIVING_SCORE: 27.14
STIFFNESS: THE SYMPTOM AFFECTS MY ACTIVITY SEVERELY
GO UP STAIRS: ACTIVITY IS VERY DIFFICULT
KNEE_ACTIVITY_OF_DAILY_LIVING_SUM: 19
PAIN: THE SYMPTOM AFFECTS MY ACTIVITY SEVERELY
SQUAT: ACTIVITY IS VERY DIFFICULT
AS_A_RESULT_OF_YOUR_KNEE_INJURY,_HOW_WOULD_YOU_RATE_YOUR_CURRENT_LEVEL_OF_DAILY_ACTIVITY?: SEVERELY ABNORMAL
SIT WITH YOUR KNEE BENT: ACTIVITY IS VERY DIFFICULT
SWELLING: THE SYMPTOM AFFECTS MY ACTIVITY MODERATELY
RAW_SCORE: 19
HOW_WOULD_YOU_RATE_THE_OVERALL_FUNCTION_OF_YOUR_KNEE_DURING_YOUR_USUAL_DAILY_ACTIVITIES?: SEVERELY ABNORMAL
WALK: ACTIVITY IS SOMEWHAT DIFFICULT
GIVING WAY, BUCKLING OR SHIFTING OF KNEE: THE SYMPTOM AFFECTS MY ACTIVITY SLIGHTLY
STAND: ACTIVITY IS SOMEWHAT DIFFICULT
LIMPING: THE SYMPTOM AFFECTS MY ACTIVITY SEVERELY
GO DOWN STAIRS: I AM UNABLE TO DO THE ACTIVITY

## 2020-11-25 ASSESSMENT — MIFFLIN-ST. JEOR: SCORE: 1271.28

## 2020-11-25 NOTE — PROGRESS NOTES
[facility] Initial Evaluation  Subjective:  The history is provided by the patient.   Patient Health History  Toshia Savage being seen for ant Rt knee pain .     Problem began: 10/15/2020.   Problem occurred: unknown   Pain is reported as 9/10 on pain scale.  General health as reported by patient is good.  Pertinent medical history includes: depression, high blood pressure, concussions/dizziness, fibromyalgia, migraines/headaches, numbness/tingling, thyroid problems, sleep disorder/apnea and smoking.   Red flags:  None as reported by patient.             Current occupation is Retail part time .                     Therapist Generated HPI Evaluation         Type of problem:  Right knee.    This is a new condition.  Condition occurred with:  Insidious onset.    Patient reports pain:  Anterior, posterior and medial.  Pain is described as sharp and burning and is constant.  Pain is worse during the night.  Since onset symptoms are gradually worsening.  Associated symptoms:  Loss of motion/stiffness. Symptoms are exacerbated by standing, walking and bending/squatting  and relieved by ice.  Special tests included:  X-ray.    Restrictions due to condition include:  Working in normal job without restrictions.                          Objective:  System         Lumbar/SI Evaluation                      SI joint/Sacrum:        Left positive at:    Ilium Ventromedial  Right positive at:    Ilium Ventromedial    Sacral conclusion right:  [place], sacral torsion, posterior inominate, locked and inflare                                  Hip Evaluation      Hip Special Testing:       Right hip positive for the following special tests:  Piriformis and Lexie'sRight hip negative for the following special tests:  SLR    Hip Palpation:      Right hip tenderness present at:  Greater Trachanter; IT Band; Piriformis; Abductors; Iliac Crest and Gluteus Medius       Knee Evaluation:        Palpation:      Right knee  tenderness present at:  Medial Joint Line; Patellar Tendon; IT Band; Gluteus Medius and Patellar Inferior            General     ROS    Assessment/Plan:    Patient is a 55 year old female with right side hip and right side knee complaints.    Patient has the following significant findings with corresponding treatment plan.                Diagnosis 1:  Rt knee pain   Pain -  hot/cold therapy, US, manual therapy and home program  Decreased ROM/flexibility - manual therapy, therapeutic exercise and home program  Decreased proprioception - neuro re-education, therapeutic activities and home program  Inflammation - cold therapy, US and self management/home program  Impaired muscle performance - neuro re-education and home program  Decreased function - therapeutic activities and home program    Therapy Evaluation Codes:   1) History comprised of:   Personal factors that impact the plan of care:      Coping style.    Comorbidity factors that impact the plan of care are:      Concussion, Dizziness, Fibromyalgia, High blood pressure, Migraines/headaches, Numbness/tingling, Osteoarthritis and Smoking.     Medications impacting care: High blood pressure and Pain.  2) Examination of Body Systems comprised of:   Body structures and functions that impact the plan of care:      Hip and Knee.   Activity limitations that impact the plan of care are:      Bending, Cooking, Squatting/kneeling, Stairs and Walking.  3) Clinical presentation characteristics are:   Stable/Uncomplicated.  4) Decision-Making    Low complexity using standardized patient assessment instrument and/or measureable assessment of functional outcome.  Cumulative Therapy Evaluation is: Low complexity.    Previous and current functional limitations:  (See Goal Flow Sheet for this information)    Short term and Long term goals: (See Goal Flow Sheet for this information)     Communication ability:  Patient appears to be able to clearly communicate and understand verbal  and written communication and follow directions correctly.  Treatment Explanation - The following has been discussed with the patient:   RX ordered/plan of care  Anticipated outcomes  Possible risks and side effects  This patient would benefit from PT intervention to resume normal activities.   Rehab potential is good.    Frequency:  1 X week, once daily  Duration:  for 8 weeks  Discharge Plan:  Achieve all LTG.  Independent in home treatment program.  Reach maximal therapeutic benefit.    Please refer to the daily flowsheet for treatment today, total treatment time and time spent performing 1:1 timed codes.

## 2020-11-25 NOTE — PROGRESS NOTES
Toshia Savage  :  1965  DOS: 2020  MRN: [id number]    Sports Medicine Clinic Visit    PCP: Arin Horne PHIL Savage is a 55 year old female who is seen in consultation at the request of  Ny Lu D.O. presenting with right knee.    Injury: Insidious gradual onset ~1 month(s).  Pain located over right anterior, deep and posterior knee, nonradiating.  Additional Features:  Positive: swelling, locking, instability and aching dull pain with intermitted sharp pain.  Symptoms are better with Rest and Elevation, brace.  Symptoms are worse with: Transitions, stairs, kneeling and squatting.  Other evaluation and/or treatments so far consists of: Ibuprofen, Rest, Elevation and brace.  Recent imaging completed: X-rays completed 20.  Prior History of related problems: None. Has had multiple cortisone shots in past ~3 years and currently being tested for Cushings.    Social History: Part-time retail work, recently retired from teaching    Review of Systems  Musculoskeletal: as above  Remainder of review of systems is negative including constitutional, CV, pulmonary, GI, Skin and Neurologic except as noted in HPI or medical history.    Past Medical History:   Diagnosis Date     Anorexia nervosas     has since 10th grade     Asthma      Chemical dependency (H)     marijuana      Chronic back pain     chronic tranadol     Concussion 10/11/94     Cyst (solitary) of breast     right - removed     Depression with anxiety 04    treated     Exercise-induced asthma 2011     Herpes labialis      Hypothyroid      Migraine     followed by Neuro     Pneumonia     hosp x 4days     Reflex sympathetic dystrophy     left foot     Vitamin D deficiency      Past Surgical History:   Procedure Laterality Date     COLONOSCOPY  11     LAPAROSCOPIC APPENDECTOMY N/A 8/15/2019    Procedure: APPENDECTOMY, LAPAROSCOPIC, possible Open;  Surgeon: Shila Appiah MD;   "Location: [facility] OR     LAPAROSCOPIC HYSTERECTOMY TOTAL  6/08    BSO.  heavy menses, ovarian polyps, fam hx uterine ca     LAPAROSCOPY PROCEDURE UNLISTED  1987     TUBAL LIGATION  2004     [facility] NONSPECIFIC PROCEDURE  1988    cryosurgery     [facility] NONSPECIFIC PROCEDURE  5/98    right breast cyst benign     [facility] NONSPECIFIC PROCEDURE  2001    left     Family History   Problem Relation Age of Onset     Cancer Mother         uterine     Arthritis Mother      Depression Mother      Depression Father      Arthritis Father      Unknown/Adopted Father         limes disease     Respiratory Maternal Grandmother      Alzheimer Disease Maternal Grandfather      Alzheimer Disease Paternal Grandmother      Alcohol/Drug Paternal Grandfather        Objective  BP (P) 124/73 (BP Location: Left arm, Patient Position: Sitting, Cuff Size: Adult Regular)   Ht (P) 1.6 m (5' 3\")   Wt (P) 71.2 kg (157 lb)   BMI (P) 27.81 kg/m        General: healthy, alert and in no distress      HEENT: no scleral icterus or conjunctival erythema     Skin: no suspicious lesions or rash. No jaundice.     CV: regular rhythm by palpation, 2+ distal pulses, no pedal edema      Resp: normal respiratory effort without conversational dyspnea     Psych: normal mood and affect      Gait: mildly antalgic, appropriate coordination and balance     Neuro: normal light touch sensory exam of the extremities. Motor strength as noted below     Left Knee exam    ROM:        Flexion 100 degrees       Extension -6 degrees       Range of motion limited by pain, effusion    Inspection:       no visible ecchymosis        effusion noted small    Skin:       no visible deformities       well perfused       capillary refill brisk    Patellar Motion:        Normal patellar tracking noted through range of motion       Crepitus noted in the patellofemoral joint    Tender:        medial patellar border       medial joint line       infrapatellar tendon       Lateral hamstring tendon    Non " Tender:         remainder of knee area    Special Tests:        positive (+) Tam       neg (-) Lachman       neg (-) anterior drawer       neg (-) posterior drawer       neg (-) varus at 0 deg and 30 deg       neg (-) valgus at 0 deg and 30 deg       Positive pain with forced extension    Evaluation of ipsilateral kinetic chain       normal strength with hip extension and abduction       Decreased right quad activation    Painful ROM of left hip mildly actively, ROM full      Radiology    XR Knee Bilat AP W/Lat/Sun/PA Rt11/9/2020  [facility]  Result Narrative   EXAM: XR KNEE BILAT AP W/LAT/SUN/PA RT  LOCATION: [facility]  DATE/TIME: 11/9/2020 4:55 PM    INDICATION: Knee pain for a few weeks, tender along the joint line.  COMPARISON: None.    IMPRESSION:   RIGHT KNEE: Mild patellofemoral compartment right knee osteoarthritis. No acute knee fracture or dislocation. Small right knee joint effusion. Mild joint space narrowing medial compartment right knee. No significant anterior right knee soft tissue swelling.    LEFT KNEE: Mild joint space narrowing medial compartment left knee.   Other Result Information   Interface, In Rad Results - 11/09/2020  5:31 PM CST  EXAM: XR KNEE BILAT AP W/LAT/SUN/PA RT  LOCATION: [facility]  DATE/TIME: 11/9/2020 4:55 PM    INDICATION: Knee pain for a few weeks, tender along the joint line.  COMPARISON: None.    IMPRESSION:   RIGHT KNEE: Mild patellofemoral compartment right knee osteoarthritis. No acute knee fracture or dislocation. Small right knee joint effusion. Mild joint space narrowing medial compartment right knee. No significant anterior right knee soft tissue swelling.    LEFT KNEE: Mild joint space narrowing medial compartment left knee.         Assessment:  1. Primary osteoarthritis of right knee    2. Acute pain of right knee    3. Effusion of right knee    4. Left hip pain        Plan:  Discussed the assessment with the patient.  Follow up: prn  based on clinical progress  XR images independently visualized and reviewed with patient today in clinic  Mild OA changes present, possible degenerative meniscus pathology as well, no instability concerns  Being worked up for Cushing's, avoid CSI at this time  Use of short 1-2 wk course of NSAIDs reviewed, dosing and precautions reviewed if utilized, topical voltaren gel option reviewed as well  PT offered and ordered, basic HEP reviewed  Will watch left hip closely, early compensatory pain present  Assistive device options like cane reviewed  RICE and compression sleeve options and strategies reviewed  Reviewed wt loss, activity modification and progressive increase in activity as tolerated and guided by pain  Reviewed options for potential steroid vs viscosupplementation injections and the possibility for future orthopedic referral prn  Reviewed safe and appropriate OTC medication choices, try tylenol first  Up to 3000mg daily of tylenol is generally safe, NSAID dosing and duration limitations reviewed  Discussed nature of degenerative arthrosis of the knee.   Discussed symptom treatment with ice or heat, topical treatments, and rest if needed.   Supportive care reviewed  All questions were answered today  Contact us with additional questions or concerns  Signs and sx of concern reviewed    Thanks very much for sending this nice lady to us, I will keep you updated with her progress      Tomasz Enrique DO, CAQ  Primary Care Sports Medicine  [facility]                 Disclaimer: This note consists of symbols derived from keyboarding, dictation and/or voice recognition software. As a result, there may be errors in the script that have gone undetected. Please consider this when interpreting information found in this chart.

## 2020-11-25 NOTE — LETTER
2020         RE: Toshia Savage  [address] [id number]        Dear Colleague,    Thank you for referring your patient, Toshia Savage, to the [facility]. Please see a copy of my visit note below.    Toshia Savage  :  1965  DOS: 2020  MRN: [id number]    Sports Medicine Clinic Visit    PCP: Arin Horne    Toshia Savage is a 55 year old female who is seen in consultation at the request of  Ny Lu D.O. presenting with right knee.    Injury: Insidious gradual onset ~1 month(s).  Pain located over right anterior, deep and posterior knee, nonradiating.  Additional Features:  Positive: swelling, locking, instability and aching dull pain with intermitted sharp pain.  Symptoms are better with Rest and Elevation, brace.  Symptoms are worse with: Transitions, stairs, kneeling and squatting.  Other evaluation and/or treatments so far consists of: Ibuprofen, Rest, Elevation and brace.  Recent imaging completed: X-rays completed 20.  Prior History of related problems: None. Has had multiple cortisone shots in past ~3 years and currently being tested for Cushings.    Social History: Part-time retail work, recently retired from teaching    Review of Systems  Musculoskeletal: as above  Remainder of review of systems is negative including constitutional, CV, pulmonary, GI, Skin and Neurologic except as noted in HPI or medical history.    Past Medical History:   Diagnosis Date     Anorexia nervosas     has since 10th grade     Asthma      Chemical dependency (H)     marijuana      Chronic back pain     chronic tranadol     Concussion 10/11/94     Cyst (solitary) of breast     right - removed     Depression with anxiety 04    treated     Exercise-induced asthma 2011     Herpes labialis      Hypothyroid      Migraine     followed by Neuro     Pneumonia     hosp x 4days     Reflex  "sympathetic dystrophy     left foot     Vitamin D deficiency      Past Surgical History:   Procedure Laterality Date     COLONOSCOPY  5-13-11     LAPAROSCOPIC APPENDECTOMY N/A 8/15/2019    Procedure: APPENDECTOMY, LAPAROSCOPIC, possible Open;  Surgeon: Shila Appiah MD;  Location: [facility] OR     LAPAROSCOPIC HYSTERECTOMY TOTAL  6/08    BSO.  heavy menses, ovarian polyps, fam hx uterine ca     LAPAROSCOPY PROCEDURE UNLISTED  1987     TUBAL LIGATION  2004     [facility] NONSPECIFIC PROCEDURE  1988    cryosurgery     [facility] NONSPECIFIC PROCEDURE  5/98    right breast cyst benign     [facility] NONSPECIFIC PROCEDURE  2001    left     Family History   Problem Relation Age of Onset     Cancer Mother         uterine     Arthritis Mother      Depression Mother      Depression Father      Arthritis Father      Unknown/Adopted Father         limes disease     Respiratory Maternal Grandmother      Alzheimer Disease Maternal Grandfather      Alzheimer Disease Paternal Grandmother      Alcohol/Drug Paternal Grandfather        Objective  BP (P) 124/73 (BP Location: Left arm, Patient Position: Sitting, Cuff Size: Adult Regular)   Ht (P) 1.6 m (5' 3\")   Wt (P) 71.2 kg (157 lb)   BMI (P) 27.81 kg/m        General: healthy, alert and in no distress      HEENT: no scleral icterus or conjunctival erythema     Skin: no suspicious lesions or rash. No jaundice.     CV: regular rhythm by palpation, 2+ distal pulses, no pedal edema      Resp: normal respiratory effort without conversational dyspnea     Psych: normal mood and affect      Gait: mildly antalgic, appropriate coordination and balance     Neuro: normal light touch sensory exam of the extremities. Motor strength as noted below     Left Knee exam    ROM:        Flexion 100 degrees       Extension -6 degrees       Range of motion limited by pain, effusion    Inspection:       no visible ecchymosis        effusion noted small    Skin:       no visible deformities       well perfused       " capillary refill brisk    Patellar Motion:        Normal patellar tracking noted through range of motion       Crepitus noted in the patellofemoral joint    Tender:        medial patellar border       medial joint line       infrapatellar tendon       Lateral hamstring tendon    Non Tender:         remainder of knee area    Special Tests:        positive (+) Tam       neg (-) Lachman       neg (-) anterior drawer       neg (-) posterior drawer       neg (-) varus at 0 deg and 30 deg       neg (-) valgus at 0 deg and 30 deg       Positive pain with forced extension    Evaluation of ipsilateral kinetic chain       normal strength with hip extension and abduction       Decreased right quad activation    Painful ROM of left hip mildly actively, ROM full      Radiology    XR Knee Bilat AP W/Lat/Sun/PA Rt11/9/2020  [facility]  Result Narrative   EXAM: XR KNEE BILAT AP W/LAT/SUN/PA RT  LOCATION: [facility]  DATE/TIME: 11/9/2020 4:55 PM    INDICATION: Knee pain for a few weeks, tender along the joint line.  COMPARISON: None.    IMPRESSION:   RIGHT KNEE: Mild patellofemoral compartment right knee osteoarthritis. No acute knee fracture or dislocation. Small right knee joint effusion. Mild joint space narrowing medial compartment right knee. No significant anterior right knee soft tissue swelling.    LEFT KNEE: Mild joint space narrowing medial compartment left knee.   Other Result Information   Interface, In Rad Results - 11/09/2020  5:31 PM CST  EXAM: XR KNEE BILAT AP W/LAT/SUN/PA RT  LOCATION: [facility]  DATE/TIME: 11/9/2020 4:55 PM    INDICATION: Knee pain for a few weeks, tender along the joint line.  COMPARISON: None.    IMPRESSION:   RIGHT KNEE: Mild patellofemoral compartment right knee osteoarthritis. No acute knee fracture or dislocation. Small right knee joint effusion. Mild joint space narrowing medial compartment right knee. No significant anterior right knee soft tissue  swelling.    LEFT KNEE: Mild joint space narrowing medial compartment left knee.         Assessment:  1. Primary osteoarthritis of right knee    2. Acute pain of right knee    3. Effusion of right knee    4. Left hip pain        Plan:  Discussed the assessment with the patient.  Follow up: prn based on clinical progress  XR images independently visualized and reviewed with patient today in clinic  Mild OA changes present, possible degenerative meniscus pathology as well, no instability concerns  Being worked up for Cushing's, avoid CSI at this time  Use of short 1-2 wk course of NSAIDs reviewed, dosing and precautions reviewed if utilized, topical voltaren gel option reviewed as well  PT offered and ordered, basic HEP reviewed  Will watch left hip closely, early compensatory pain present  Assistive device options like cane reviewed  RICE and compression sleeve options and strategies reviewed  Reviewed wt loss, activity modification and progressive increase in activity as tolerated and guided by pain  Reviewed options for potential steroid vs viscosupplementation injections and the possibility for future orthopedic referral prn  Reviewed safe and appropriate OTC medication choices, try tylenol first  Up to 3000mg daily of tylenol is generally safe, NSAID dosing and duration limitations reviewed  Discussed nature of degenerative arthrosis of the knee.   Discussed symptom treatment with ice or heat, topical treatments, and rest if needed.   Supportive care reviewed  All questions were answered today  Contact us with additional questions or concerns  Signs and sx of concern reviewed    Thanks very much for sending this nice lady to us, I will keep you updated with her progress      Tomasz Enrique DO, WALE  Primary Care Sports Medicine  [facility]                 Disclaimer: This note consists of symbols derived from keyboarding, dictation and/or voice recognition software. As a result, there may be  errors in the script that have gone undetected. Please consider this when interpreting information found in this chart.        Again, thank you for allowing me to participate in the care of your patient.        Sincerely,        Tomasz Enrique, DO

## 2020-11-28 LAB — COMPREHEN DRUG ANALYSIS UR: NORMAL

## 2020-11-30 ENCOUNTER — TRANSFERRED RECORDS (OUTPATIENT)
Dept: HEALTH INFORMATION MANAGEMENT | Facility: CLINIC | Age: 55
End: 2020-11-30

## 2020-12-01 LAB
COLLECT DURATION TIME UR: 24 H
CORTIS 24H UR-MRATE: 4.6 MCG/24 H (ref 3.5–45)
CORTISONE 24H UR-MRATE: 35 MCG/24 H (ref 17–129)
SPECIMEN VOL 24H UR: 1650 ML

## 2020-12-02 ENCOUNTER — TRANSFERRED RECORDS (OUTPATIENT)
Dept: HEALTH INFORMATION MANAGEMENT | Facility: CLINIC | Age: 55
End: 2020-12-02

## 2020-12-02 ENCOUNTER — TELEPHONE (OUTPATIENT)
Dept: ORTHOPEDICS | Facility: CLINIC | Age: 55
End: 2020-12-02

## 2020-12-02 DIAGNOSIS — M25.361 KNEE INSTABILITY, RIGHT: ICD-10-CM

## 2020-12-02 DIAGNOSIS — M25.552 LEFT HIP PAIN: ICD-10-CM

## 2020-12-02 DIAGNOSIS — M25.561 ACUTE PAIN OF RIGHT KNEE: Primary | ICD-10-CM

## 2020-12-02 DIAGNOSIS — M53.3 SI (SACROILIAC) JOINT DYSFUNCTION: ICD-10-CM

## 2020-12-02 DIAGNOSIS — M25.461 EFFUSION OF RIGHT KNEE: ICD-10-CM

## 2020-12-02 DIAGNOSIS — M17.11 PRIMARY OSTEOARTHRITIS OF RIGHT KNEE: ICD-10-CM

## 2020-12-02 NOTE — TELEPHONE ENCOUNTER
----- Message from Tomasz Enrique DO sent at 12/2/2020  2:56 PM CST -----  Regarding: FW: Knee/hip  Please amend, change PT order, thanks.  SI joint dysfunction.      ----- Message -----  From: Jean-Pierre Poe PT  Sent: 12/2/2020  12:14 PM CST  To: Tomasz Enrique DO  Subject: Knee/hip                                         Tomasz,  Could I ask that we expand Rosibel's referral to include her SI joints as well as her hip? I found her off in the pelvis and her gluts are inhibited on the involved side so I would like to address all of the involved issues.  Thanks    Shannon Poe PT  [facility]

## 2020-12-03 ENCOUNTER — THERAPY VISIT (OUTPATIENT)
Dept: PHYSICAL THERAPY | Facility: CLINIC | Age: 55
End: 2020-12-03
Payer: COMMERCIAL

## 2020-12-03 DIAGNOSIS — M25.561 ACUTE PAIN OF RIGHT KNEE: ICD-10-CM

## 2020-12-03 PROCEDURE — 97035 APP MDLTY 1+ULTRASOUND EA 15: CPT | Mod: GP | Performed by: PHYSICAL THERAPIST

## 2020-12-03 PROCEDURE — 97140 MANUAL THERAPY 1/> REGIONS: CPT | Mod: GP | Performed by: PHYSICAL THERAPIST

## 2020-12-03 PROCEDURE — 97110 THERAPEUTIC EXERCISES: CPT | Mod: GP | Performed by: PHYSICAL THERAPIST

## 2020-12-07 ENCOUNTER — APPOINTMENT (OUTPATIENT)
Dept: PHYSICAL THERAPY | Facility: CLINIC | Age: 55
End: 2020-12-07
Payer: COMMERCIAL

## 2020-12-07 DIAGNOSIS — M25.561 ACUTE PAIN OF RIGHT KNEE: ICD-10-CM

## 2020-12-10 ENCOUNTER — TELEPHONE (OUTPATIENT)
Dept: PHYSICAL THERAPY | Facility: CLINIC | Age: 55
End: 2020-12-10

## 2020-12-10 ENCOUNTER — THERAPY VISIT (OUTPATIENT)
Dept: PHYSICAL THERAPY | Facility: CLINIC | Age: 55
End: 2020-12-10
Payer: COMMERCIAL

## 2020-12-10 DIAGNOSIS — M25.561 ACUTE PAIN OF RIGHT KNEE: ICD-10-CM

## 2020-12-10 DIAGNOSIS — M17.11 PRIMARY OSTEOARTHRITIS OF RIGHT KNEE: Primary | ICD-10-CM

## 2020-12-10 PROCEDURE — 97530 THERAPEUTIC ACTIVITIES: CPT | Mod: GP | Performed by: PHYSICAL THERAPIST

## 2020-12-10 NOTE — PROGRESS NOTES
"Subjective:  HPI  Physical Exam                    Objective:  System    Physical Exam    General     ROS    Assessment/Plan:    SUBJECTIVE  Subjective changes as noted by pt:       Patient arrived to PT today stating she had a very poor experience at the [facility] and is choosing to continue care at [facility] as a result. Patient states that the provider she worked with was \"very innapropriate\" with her on 3 different occasions. She explained that there was two occasions of inappropriate staring and one occasion where the provider did not leave the treatment room when the patient was removing her pants to remove a knee brace from her leg, despite the patient asking for privacy. She has asked that this complaint be escallated to [facility] administrative team.     Patient reports no significant changes since she started physical therapy on 11/25.        Current pain level: 10/10     Changes in function:  Yes (See Goal flowsheet attached for changes in current functional level)     Adverse reaction to treatment or activity:  None    OBJECTIVE  Changes in objective findings:    Objective: Significant time spent today listening the patients frustrations with her previous physical therapy experience. >70% reduction in knee pain while wearing an  knee brace. Patient would like to pursue obtaining a brace today.  Tenderness over the patellar tendon and medial joint line on right.      ASSESSMENT  Toshia continues to require intervention to meet STG and LTG's: PT  No change of symptoms has been noted.  Response to therapy has shown lack of progress in  pain level and function  Progress made towards STG/LTG?  None    PLAN  Continue current treatment plan until patient demonstrates readiness to progress to higher level exercises.  Include  bracing into the POC    PTA/ATC plan:  N/A    Please refer to the daily flowsheet for treatment today, total treatment time and time spent performing 1:1 timed " codes.

## 2020-12-10 NOTE — PROGRESS NOTES
Knee  Brace Trial:     Pain Rating    Affected Joint: Right Knee    Without  brace:    At rest 3/10  Walking: 10/10    Squatting: 10/10   Stairs: 10/10      Wearing  brace:    At rest 3/10  Walking: 3/10    Squatting: 3/10   Stairs: 3/10      Brace and Measurements:    Brace trialed:     Type - [place]  One    Size - Medium and Short Version  Side - Medial  Affected Knee - Right Knee    Circumference 6 inches below mid patella: 14 inches    Other Comments:  She is on the edge of a small/medium. We trialed a medium and it fit well. A short version is being requested.

## 2020-12-16 NOTE — TELEPHONE ENCOUNTER
Patient LVM requesting to get an MRI before the end of the calendar year. Reports she has been doing PT. Would like a call back.

## 2020-12-16 NOTE — TELEPHONE ENCOUNTER
Called and spoke with patient. Notes persisting right knee pain despite completing 4 visits of PT (3 at [facility] and 1 at [facility]).  She has experienced locking and instability in the right knee over the last 2 weeks.  Denies any new injury or accident.  Currently wearing a brace and using crutches to help ambulate.     She would like to pursue right knee MRI.  I explained that I would pass this message along to Dr. Enrique for his recommendations.  She expressed understanding.    Marely Rob, ATC

## 2020-12-17 ENCOUNTER — THERAPY VISIT (OUTPATIENT)
Dept: PHYSICAL THERAPY | Facility: CLINIC | Age: 55
End: 2020-12-17
Payer: COMMERCIAL

## 2020-12-17 DIAGNOSIS — M25.561 ACUTE PAIN OF RIGHT KNEE: ICD-10-CM

## 2020-12-17 PROCEDURE — 97110 THERAPEUTIC EXERCISES: CPT | Mod: GP | Performed by: PHYSICAL THERAPIST

## 2020-12-17 PROCEDURE — 97140 MANUAL THERAPY 1/> REGIONS: CPT | Mod: GP | Performed by: PHYSICAL THERAPIST

## 2020-12-30 ENCOUNTER — OFFICE VISIT (OUTPATIENT)
Dept: ORTHOPEDICS | Facility: CLINIC | Age: 55
End: 2020-12-30
Payer: COMMERCIAL

## 2020-12-30 VITALS
HEIGHT: 63 IN | SYSTOLIC BLOOD PRESSURE: 152 MMHG | WEIGHT: 158 LBS | BODY MASS INDEX: 28 KG/M2 | DIASTOLIC BLOOD PRESSURE: 94 MMHG

## 2020-12-30 DIAGNOSIS — M16.12 PRIMARY OSTEOARTHRITIS OF LEFT HIP: ICD-10-CM

## 2020-12-30 DIAGNOSIS — M25.561 ACUTE PAIN OF RIGHT KNEE: Primary | ICD-10-CM

## 2020-12-30 DIAGNOSIS — M17.11 PRIMARY OSTEOARTHRITIS OF RIGHT KNEE: ICD-10-CM

## 2020-12-30 DIAGNOSIS — M25.552 LEFT HIP PAIN: ICD-10-CM

## 2020-12-30 PROCEDURE — 20611 DRAIN/INJ JOINT/BURSA W/US: CPT | Mod: RT | Performed by: FAMILY MEDICINE

## 2020-12-30 PROCEDURE — 99214 OFFICE O/P EST MOD 30 MIN: CPT | Mod: 25 | Performed by: FAMILY MEDICINE

## 2020-12-30 RX ADMIN — TRIAMCINOLONE ACETONIDE 40 MG: 40 INJECTION, SUSPENSION INTRA-ARTICULAR; INTRAMUSCULAR at 15:30

## 2020-12-30 RX ADMIN — ROPIVACAINE HYDROCHLORIDE 3 ML: 5 INJECTION, SOLUTION EPIDURAL; INFILTRATION; PERINEURAL at 15:30

## 2020-12-30 ASSESSMENT — MIFFLIN-ST. JEOR: SCORE: 1275.81

## 2020-12-30 NOTE — LETTER
2020         RE: Toshia Savage  [address] [facility] [id number]        Dear Colleague,    Thank you for referring your patient, Toshia Savage, to the [facility]. Please see a copy of my visit note below.    Toshia Savage  :  1965  DOS: 20  MRN: [id number]    Sports Medicine Clinic Visit    PCP: Arin Horne    Toshia Savage is a 55 year old female who is seen in consultation at the request of  Ny Lu D.O. presenting with right knee.    Injury: Insidious gradual onset ~1 month(s).  Pain located over right anterior, deep and posterior knee, nonradiating.  Additional Features:  Positive: swelling, locking, instability and aching dull pain with intermitted sharp pain.  Symptoms are better with Rest and Elevation, brace.  Symptoms are worse with: Transitions, stairs, kneeling and squatting.  Other evaluation and/or treatments so far consists of: Ibuprofen, Rest, Elevation and brace.  Recent imaging completed: X-rays completed 20.  Prior History of related problems: None. Has had multiple cortisone shots in past ~3 years and currently being tested for Cushings.    Social History: Part-time retail work, recently retired from teaching    Interim History - 2020  Since last visit on 2020 patient has moderate right knee and left hip pain.  Patient reports that her generalized joint pain and bilateral ankle swelling is much improved after discontinuing Zetia medication in 20.  She wakes with minimal discomfort, but notes that right knee pain worsens through out the day. No interim injury.       Review of Systems  Musculoskeletal: as above  Remainder of review of systems is negative including constitutional, CV, pulmonary, GI, Skin and Neurologic except as noted in HPI or medical history.    Past Medical History:   Diagnosis Date     Anorexia nervosas     has since 10th grade      "Asthma      Chemical dependency (H)     marijuana      Chronic back pain     chronic tranadol     Concussion 10/11/94     Cyst (solitary) of breast 5/98    right - removed     Depression with anxiety 04    treated     Exercise-induced asthma 11/8/2011     Herpes labialis      Hypothyroid      Migraine 1989    followed by Neuro     Pneumonia 8/99    hosp x 4days     Reflex sympathetic dystrophy     left foot     Vitamin D deficiency      Past Surgical History:   Procedure Laterality Date     COLONOSCOPY  5-13-11     LAPAROSCOPIC APPENDECTOMY N/A 8/15/2019    Procedure: APPENDECTOMY, LAPAROSCOPIC, possible Open;  Surgeon: Shila Appiah MD;  Location: [facility] OR     LAPAROSCOPIC HYSTERECTOMY TOTAL  6/08    BSO.  heavy menses, ovarian polyps, fam hx uterine ca     LAPAROSCOPY PROCEDURE UNLISTED  1987     TUBAL LIGATION  2004     ZZ NONSPECIFIC PROCEDURE  1988    cryosurgery     Z NONSPECIFIC PROCEDURE  5/98    right breast cyst benign     Z NONSPECIFIC PROCEDURE  2001    left     Family History   Problem Relation Age of Onset     Cancer Mother         uterine     Arthritis Mother      Depression Mother      Depression Father      Arthritis Father      Unknown/Adopted Father         limes disease     Respiratory Maternal Grandmother      Alzheimer Disease Maternal Grandfather      Alzheimer Disease Paternal Grandmother      Alcohol/Drug Paternal Grandfather        Objective  BP (!) 152/94   Ht 1.6 m (5' 3\")   Wt 71.7 kg (158 lb)   BMI 27.99 kg/m        General: healthy, alert and in no distress      HEENT: no scleral icterus or conjunctival erythema     Skin: no suspicious lesions or rash. No jaundice.     CV: regular rhythm by palpation, 2+ distal pulses, no pedal edema      Resp: normal respiratory effort without conversational dyspnea     Psych: normal mood and affect      Gait: mildly antalgic, appropriate coordination and balance     Neuro: normal light touch sensory exam of the extremities. Motor " strength as noted below     Left Knee exam    ROM:        Flexion 100 degrees       Extension -6 degrees       Range of motion limited by pain, effusion    Inspection:       no visible ecchymosis        effusion noted small    Skin:       no visible deformities       well perfused       capillary refill brisk    Patellar Motion:        Normal patellar tracking noted through range of motion       Crepitus noted in the patellofemoral joint    Tender:        medial patellar border       medial joint line       infrapatellar tendon       Lateral hamstring tendon    Non Tender:         remainder of knee area    Special Tests:        positive (+) Tam       neg (-) varus at 0 deg and 30 deg       neg (-) valgus at 0 deg and 30 deg       Positive pain with forced extension    Evaluation of ipsilateral kinetic chain       normal strength with hip extension and abduction       Decreased right quad activation    Painful ROM of left hip mildly actively, ROM full, + mild FADIR      Radiology    XR Knee Bilat AP W/Lat/Sun/PA Rt11/9/2020  [facility]  Result Narrative   EXAM: XR KNEE BILAT AP W/LAT/SUN/PA RT  LOCATION: [facility]  DATE/TIME: 11/9/2020 4:55 PM    INDICATION: Knee pain for a few weeks, tender along the joint line.  COMPARISON: None.    IMPRESSION:   RIGHT KNEE: Mild patellofemoral compartment right knee osteoarthritis. No acute knee fracture or dislocation. Small right knee joint effusion. Mild joint space narrowing medial compartment right knee. No significant anterior right knee soft tissue swelling.    LEFT KNEE: Mild joint space narrowing medial compartment left knee.   Other Result Information   Interface, In Rad Results - 11/09/2020  5:31 PM CST  EXAM: XR KNEE BILAT AP W/LAT/SUN/PA RT  LOCATION: [facility]  DATE/TIME: 11/9/2020 4:55 PM    INDICATION: Knee pain for a few weeks, tender along the joint line.  COMPARISON: None.    IMPRESSION:   RIGHT KNEE: Mild patellofemoral  compartment right knee osteoarthritis. No acute knee fracture or dislocation. Small right knee joint effusion. Mild joint space narrowing medial compartment right knee. No significant anterior right knee soft tissue swelling.    LEFT KNEE: Mild joint space narrowing medial compartment left knee.     Prior CT abdomen/pelvis reviewed in detail today, showing mild OA with acetabular spurring      Large Joint Injection/Arthocentesis: R knee joint    Date/Time: 12/30/2020 3:30 PM  Performed by: Tomasz Enrique DO  Authorized by: Tomasz Enrique DO     Indications:  Pain and osteoarthritis  Needle Size:  21 G  Guidance: ultrasound    Approach:  Superolateral  Location:  Knee      Medications:  3 mL ropivacaine 5 MG/ML; 40 mg triamcinolone 40 MG/ML  Aspirate amount (mL):  9  Aspirate:  Serous and yellow  Outcome:  Tolerated well, no immediate complications  Procedure discussed: discussed risks, benefits, and alternatives    Consent Given by:  Patient  Timeout: timeout called immediately prior to procedure    Prep: patient was prepped and draped in usual sterile fashion        Assessment:  1. Acute pain of right knee    2. Primary osteoarthritis of right knee    3. Left hip pain    4. Primary osteoarthritis of left hip        Plan:  Discussed the assessment with the patient.  Follow up: prn based on clinical progress  XR and CT images independently visualized and reviewed with patient today in clinic  Mild OA changes present, possible degenerative meniscus pathology as well, intermittent instability concerns  Neg workup for Cushing's, most of her constitutional sx and joint pains are improvd at least 50% since discontinuing Fetzima  Reviewed option for ongoing watchful waiting, MRI of knee (scheduled tomorrow), and likelihood of direct or indirect relationship to medication and current sx  She has underlying wear-and-tear regardless, and still has considerable pain with walking  Trial of US guided  aspiration and CSI today, compression brace also provided  PT offered and ordered, basic HEP reviewed  Will watch left hip closely, early compensatory pain present, signs of mild OA on prior CT, can offer CSI in the future for labile pain but will hope to avoid especially if her knee pain improves s/p injection  Assistive device options like cane reviewed  RICE and compression sleeve options and strategies reviewed  Reviewed wt loss, activity modification and progressive increase in activity as tolerated and guided by pain  Reviewed options for potential steroid vs viscosupplementation injections and the possibility for future orthopedic referral prn  Reviewed safe and appropriate OTC medication choices, try tylenol first  Up to 3000mg daily of tylenol is generally safe, NSAID dosing and duration limitations reviewed  Discussed nature of degenerative arthrosis of the knee.   Discussed symptom treatment with ice or heat, topical treatments, and rest if needed.   Expectations and goals of CSI reviewed  Often 2-3 days for steroid effect, and can take up to two weeks for maximum effect  We discussed modified progressive pain-free activity as tolerated  Do not overuse in first two weeks if feeling better due to concern for vulnerability while steroid is working  Supportive care reviewed  All questions were answered today  Contact us with additional questions or concerns  Signs and sx of concern reviewed      Tomasz Enrqiue DO, WALE  Primary Care Sports Medicine  [facility]       Time spent in one-on-one evaluation and discussion with patient regarding nature of problem, course, prior treatments, and therapeutic options, at least 50% of which was spent in counseling and coordination of care: 27 minutes            Disclaimer: This note consists of symbols derived from keyboarding, dictation and/or voice recognition software. As a result, there may be errors in the script that have gone undetected.  Please consider this when interpreting information found in this chart.        Again, thank you for allowing me to participate in the care of your patient.        Sincerely,        Tomasz Enrique, DO

## 2020-12-31 ENCOUNTER — ANCILLARY PROCEDURE (OUTPATIENT)
Dept: MRI IMAGING | Facility: CLINIC | Age: 55
End: 2020-12-31
Attending: FAMILY MEDICINE
Payer: COMMERCIAL

## 2020-12-31 DIAGNOSIS — M25.461 EFFUSION OF RIGHT KNEE: ICD-10-CM

## 2020-12-31 DIAGNOSIS — M25.561 ACUTE PAIN OF RIGHT KNEE: ICD-10-CM

## 2020-12-31 DIAGNOSIS — M17.11 PRIMARY OSTEOARTHRITIS OF RIGHT KNEE: ICD-10-CM

## 2020-12-31 DIAGNOSIS — M25.361 KNEE INSTABILITY, RIGHT: ICD-10-CM

## 2020-12-31 PROCEDURE — 73721 MRI JNT OF LWR EXTRE W/O DYE: CPT | Mod: RT | Performed by: RADIOLOGY

## 2020-12-31 RX ORDER — TRIAMCINOLONE ACETONIDE 40 MG/ML
40 INJECTION, SUSPENSION INTRA-ARTICULAR; INTRAMUSCULAR
Status: DISCONTINUED | OUTPATIENT
Start: 2020-12-30 | End: 2021-09-17 | Stop reason: ALTCHOICE

## 2020-12-31 RX ORDER — ROPIVACAINE HYDROCHLORIDE 5 MG/ML
3 INJECTION, SOLUTION EPIDURAL; INFILTRATION; PERINEURAL
Status: DISCONTINUED | OUTPATIENT
Start: 2020-12-30 | End: 2021-09-17 | Stop reason: ALTCHOICE

## 2021-01-06 ENCOUNTER — THERAPY VISIT (OUTPATIENT)
Dept: PHYSICAL THERAPY | Facility: CLINIC | Age: 56
End: 2021-01-06
Payer: COMMERCIAL

## 2021-01-06 DIAGNOSIS — M25.561 ACUTE PAIN OF RIGHT KNEE: ICD-10-CM

## 2021-01-06 PROCEDURE — 97530 THERAPEUTIC ACTIVITIES: CPT | Mod: GP | Performed by: PHYSICAL THERAPIST

## 2021-01-08 ENCOUNTER — OFFICE VISIT (OUTPATIENT)
Dept: ORTHOPEDICS | Facility: CLINIC | Age: 56
End: 2021-01-08
Payer: COMMERCIAL

## 2021-01-08 VITALS
WEIGHT: 158 LBS | SYSTOLIC BLOOD PRESSURE: 115 MMHG | HEIGHT: 63 IN | BODY MASS INDEX: 28 KG/M2 | DIASTOLIC BLOOD PRESSURE: 70 MMHG

## 2021-01-08 DIAGNOSIS — M25.561 RIGHT KNEE PAIN, UNSPECIFIED CHRONICITY: ICD-10-CM

## 2021-01-08 DIAGNOSIS — M17.11 PRIMARY OSTEOARTHRITIS OF RIGHT KNEE: Primary | ICD-10-CM

## 2021-01-08 PROCEDURE — 99214 OFFICE O/P EST MOD 30 MIN: CPT | Performed by: FAMILY MEDICINE

## 2021-01-08 ASSESSMENT — MIFFLIN-ST. JEOR: SCORE: 1275.81

## 2021-01-08 NOTE — PROGRESS NOTES
Toshia Savage  :  1965  DOS: 21  MRN: [id number]    Sports Medicine Clinic Visit    PCP: Arin Hornephong Savage is a 55 year old female who is seen in consultation at the request of  Ny Lu D.O. presenting with right knee.    Injury: Insidious gradual onset ~1 month(s).  Pain located over right anterior, deep and posterior knee, nonradiating.  Additional Features:  Positive: swelling, locking, instability and aching dull pain with intermitted sharp pain.  Symptoms are better with Rest and Elevation, brace.  Symptoms are worse with: Transitions, stairs, kneeling and squatting.  Other evaluation and/or treatments so far consists of: Ibuprofen, Rest, Elevation and brace.  Recent imaging completed: X-rays completed 20.  Prior History of related problems: None. Has had multiple cortisone shots in past ~3 years and currently being tested for Cushings.    Social History: Part-time retail work, recently retired from teaching    Interim History - 2020  Since last visit on 2020 patient has moderate right knee and left hip pain.  Patient reports that her generalized joint pain and bilateral ankle swelling is much improved after discontinuing Zetia medication in 20.  She wakes with minimal discomfort, but notes that right knee pain worsens through out the day. No interim injury.       Interim History - 2021  Since last visit on 2020 patient has moderate-severe right knee pain.  Right knee steroid injection and aspiration completed on 20 provided minimal relief, she reported no relief day of procedure.  She is ambulating with single crutch today.  No new injury in the interim.      Review of Systems  Musculoskeletal: as above  Remainder of review of systems is negative including constitutional, CV, pulmonary, GI, Skin and Neurologic except as noted in HPI or medical history.    Past Medical History:   Diagnosis Date      "Anorexia nervosas 11/96    has since 10th grade     Asthma      Chemical dependency (H)     marijuana      Chronic back pain     chronic tranadol     Concussion 10/11/94     Cyst (solitary) of breast 5/98    right - removed     Depression with anxiety 04    treated     Exercise-induced asthma 11/8/2011     Herpes labialis      Hypothyroid      Migraine 1989    followed by Neuro     Pneumonia 8/99    hosp x 4days     Reflex sympathetic dystrophy     left foot     Vitamin D deficiency      Past Surgical History:   Procedure Laterality Date     COLONOSCOPY  5-13-11     LAPAROSCOPIC APPENDECTOMY N/A 8/15/2019    Procedure: APPENDECTOMY, LAPAROSCOPIC, possible Open;  Surgeon: Shila Appiah MD;  Location: [facility] OR     LAPAROSCOPIC HYSTERECTOMY TOTAL  6/08    BSO.  heavy menses, ovarian polyps, fam hx uterine ca     LAPAROSCOPY PROCEDURE UNLISTED  1987     TUBAL LIGATION  2004     ZZ NONSPECIFIC PROCEDURE  1988    cryosurgery     Z NONSPECIFIC PROCEDURE  5/98    right breast cyst benign     Z NONSPECIFIC PROCEDURE  2001    left     Family History   Problem Relation Age of Onset     Cancer Mother         uterine     Arthritis Mother      Depression Mother      Depression Father      Arthritis Father      Unknown/Adopted Father         limes disease     Respiratory Maternal Grandmother      Alzheimer Disease Maternal Grandfather      Alzheimer Disease Paternal Grandmother      Alcohol/Drug Paternal Grandfather        Objective  /70   Ht 1.6 m (5' 3\")   Wt 71.7 kg (158 lb)   BMI 27.99 kg/m        General: healthy, alert and in no distress      HEENT: no scleral icterus or conjunctival erythema     Skin: no suspicious lesions or rash. No jaundice.     CV: regular rhythm by palpation, 2+ distal pulses, no pedal edema      Resp: normal respiratory effort without conversational dyspnea     Psych: normal mood and affect      Gait: mildly antalgic, appropriate coordination and balance     Neuro: normal light " touch sensory exam of the extremities. Motor strength as noted below     Left Knee exam    ROM:        Flexion 120 degrees       Extension -4 degrees       Range of motion limited by pain, effusion    Inspection:       no visible ecchymosis        effusion noted trace    Skin:       no visible deformities       well perfused       capillary refill brisk    Patellar Motion:        Normal patellar tracking noted through range of motion       Crepitus noted in the patellofemoral joint    Tender:        Most focal today over pes anserine bursa       medial patellar border very mild       medial joint line mild       infrapatellar tendon improved       Lateral hamstring tendon mild/moderate    Non Tender:         remainder of knee area    Special Tests:       Painful Tam       neg (-) varus at 0 deg and 30 deg       neg (-) valgus at 0 deg and 30 deg, pain with valgus stress over medial joint line       Positive pain with forced extension    Evaluation of ipsilateral kinetic chain       normal strength with hip extension and abduction       Decreased right quad activation    Painful ROM of left hip mildly actively, ROM full, + mild FADIR      Radiology    XR Knee Bilat AP W/Lat/Sun/PA Rt11/9/2020  [facility]  Result Narrative   EXAM: XR KNEE BILAT AP W/LAT/SUN/PA RT  LOCATION: [facility] CLINIC  DATE/TIME: 11/9/2020 4:55 PM    INDICATION: Knee pain for a few weeks, tender along the joint line.  COMPARISON: None.    IMPRESSION:   RIGHT KNEE: Mild patellofemoral compartment right knee osteoarthritis. No acute knee fracture or dislocation. Small right knee joint effusion. Mild joint space narrowing medial compartment right knee. No significant anterior right knee soft tissue swelling.    LEFT KNEE: Mild joint space narrowing medial compartment left knee.   Other Result Information   Interface, In Rad Results - 11/09/2020  5:31 PM CST  EXAM: XR KNEE BILAT AP W/LAT/SUN/PA RT  LOCATION: [facility]  CLINIC  DATE/TIME: 11/9/2020 4:55 PM    INDICATION: Knee pain for a few weeks, tender along the joint line.  COMPARISON: None.    IMPRESSION:   RIGHT KNEE: Mild patellofemoral compartment right knee osteoarthritis. No acute knee fracture or dislocation. Small right knee joint effusion. Mild joint space narrowing medial compartment right knee. No significant anterior right knee soft tissue swelling.    LEFT KNEE: Mild joint space narrowing medial compartment left knee.     Prior CT abdomen/pelvis reviewed in detail today, showing mild OA with acetabular spurring    Recent Results (from the past 744 hour(s))   MR Knee Right w/o Contrast    Narrative    MR right knee without contrast 12/31/2020 8:33 AM    Techniques: Multiplanar multisequence imaging of the right knee was  obtained without administration of intra-articular or intravenous  contrast using routing protocol.    History: Knee instability; Knee pain, persistent, > 6wks of  conservative tx; known OA with progressive pain and increased  instability; Acute pain of right knee; Primary osteoarthritis of right  knee; Effusion of right knee; Knee instability, right    Additional History from EMR: Acute event November 6 with subsequent  acute knee pain    Comparison: None available    Findings:    MENISCI:  Medial meniscus: There is a near full-thickness tear of the posterior  root attachment of the medial meniscus (series 5, images 19, 18). In  the area of the expected posterior root attachment there is edema and  a small cortical break involving the posterior tibial plateau at the  expected site of the posterior root attachment (series 6, image 18).  There is increased intrasubstance signal with a subtle horizontal  oblique tear of the posterior horn of the medial meniscus extending  towards the root attachment. The meniscus is 3 mm extruded (series 6,  image 13) blunting of the body of the medial meniscus is noted.  Lateral meniscus: The lateral meniscus is  intact and unremarkable.    LIGAMENTS  Cruciate ligaments: Mildly increased signal within the ACL, however,  no evidence of acute tear. Posterior cruciate ligament is intact.  Medial supporting structures: Bowing of the tibial collateral  ligament. Low-grade grade sprain of the proximal attachment site of  the tibial collateral ligament.  Lateral supporting structures: Iliotibial band, lateral collateral  ligament popliteus tendon and biceps femoris tendon are intact and  unremarkable.    EXTENSOR MECHANISM  Preserved.    FLUID  Small joint effusion. No substantial Baker's cyst.    OSSEOUS and ARTICULAR STRUCTURES  Bones: No fracture, contusion, or osseous lesion is seen.    Patellofemoral compartment: Focal full-thickness articular cartilage  fissuring centered about the median ridge and the lateral patella  facet. Moderate grade fissuring in the central aspect of the trochlea.    Medial compartment: Focal areas of moderate to high-grade cartilage  loss in the distal femoral condyle and tibial plateau (series 6, image  12 through 14).   Lateral compartment: Mild superficial cartilage fraying.    ANCILLARY FINDINGS  None.      Impression    Impression:  1. Subacute appearing near full-thickness tear of the posterior root  attachment of the medial meniscus with associated focal edema and  subtle cortical break in the expected attachment side of the posterior  root of the medial meniscus. Subtle horizontal oblique tear and  increased intrasubstance signal extending from the posterior horn into  the posterior root.  2. Extrusion of the medial meniscus, the weightbearing aspect of the  medial compartment is largely meniscal deficient. Associated moderate  to high-grade focal areas of cartilage loss in the weightbearing  aspect of the medial distal femoral condyle and tibial plateau.  3. Focal areas of moderate to high-grade articular cartilage fissuring  within the patellofemoral joint as described above.  4. Low-grade  sprain of the proximal tibial collateral ligament.  5. Small joint effusion.  6. ACL, posterior cruciate ligament, lateral supporting structures are  intact.    JUTTA ELLERMANN, MD       Procedures  Assessment:  1. Primary osteoarthritis of right knee    2. Right knee pain, unspecified chronicity        Plan:  Discussed the assessment with the patient.  Follow up: prn based on clinical progress  XR, MRI and CT images independently visualized and reviewed with patient today in clinic  Reviewed option for ongoing watchful waiting, MRI of knee reviewed showing meniscal root tear and more OA changes than appreciated on XR, and likelihood of direct or indirect relationship of knee pain to recent medication changes   She has underlying wear-and-tear regardless, and still has considerable pain with walking  Trial of US guided aspiration and CSI helpful for some of the knee joint pain, but still difficulty with ambulating comfortably  Most focal pain today over pes anserine bursa  Reviewed option for pes anserine injection  After review of MRI, she would like to discuss TKA vs other options with orthopedic surgery, referral placed  Defer injection for now as it will likely affect timing of potential surgery  Strategies for  vs compression brace also reviewed  PT offered and ordered, basic HEP reviewed  Assistive device options like cane reviewed  Temporary handicap parking permit provided, reviewed appropriate use  Reviewed safe and appropriate OTC medication choices, try tylenol first  Also consider trying voltaren gel topically, up to QID prn  Up to 3000mg daily of tylenol is generally safe, NSAID dosing and duration limitations reviewed  Supportive care reviewed  All questions were answered today  Contact us with additional questions or concerns  Signs and sx of concern reviewed      Tomasz Enrique DO, WALE  Primary Care Sports Medicine  [facility]               Disclaimer: This note consists  of symbols derived from keyboarding, dictation and/or voice recognition software. As a result, there may be errors in the script that have gone undetected. Please consider this when interpreting information found in this chart.

## 2021-01-08 NOTE — LETTER
2021         RE: Toshia Savage  [address] [place] [address]        Dear Colleague,    Thank you for referring your patient, Toshia Savage, to the [facility]. Please see a copy of my visit note below.    Toshia Savage  :  1965  DOS: 21  MRN: [id number]    Sports Medicine Clinic Visit    PCP: Arin Horne    Toshia Savage is a 55 year old female who is seen in consultation at the request of  Ny Lu D.O. presenting with right knee.    Injury: Insidious gradual onset ~1 month(s).  Pain located over right anterior, deep and posterior knee, nonradiating.  Additional Features:  Positive: swelling, locking, instability and aching dull pain with intermitted sharp pain.  Symptoms are better with Rest and Elevation, brace.  Symptoms are worse with: Transitions, stairs, kneeling and squatting.  Other evaluation and/or treatments so far consists of: Ibuprofen, Rest, Elevation and brace.  Recent imaging completed: X-rays completed 20.  Prior History of related problems: None. Has had multiple cortisone shots in past ~3 years and currently being tested for Cushings.    Social History: Part-time retail work, recently retired from teaching    Interim History - 2020  Since last visit on 2020 patient has moderate right knee and left hip pain.  Patient reports that her generalized joint pain and bilateral ankle swelling is much improved after discontinuing Zetia medication in 20.  She wakes with minimal discomfort, but notes that right knee pain worsens through out the day. No interim injury.       Interim History - 2021  Since last visit on 2020 patient has moderate-severe right knee pain.  Right knee steroid injection and aspiration completed on 20 provided minimal relief, she reported no relief day of procedure.  She is ambulating with single crutch today.  No new  "injury in the interim.      Review of Systems  Musculoskeletal: as above  Remainder of review of systems is negative including constitutional, CV, pulmonary, GI, Skin and Neurologic except as noted in HPI or medical history.    Past Medical History:   Diagnosis Date     Anorexia nervosas 11/96    has since 10th grade     Asthma      Chemical dependency (H)     marijuana      Chronic back pain     chronic tranadol     Concussion 10/11/94     Cyst (solitary) of breast 5/98    right - removed     Depression with anxiety 04    treated     Exercise-induced asthma 11/8/2011     Herpes labialis      Hypothyroid      Migraine 1989    followed by Neuro     Pneumonia 8/99    hosp x 4days     Reflex sympathetic dystrophy     left foot     Vitamin D deficiency      Past Surgical History:   Procedure Laterality Date     COLONOSCOPY  5-13-11     LAPAROSCOPIC APPENDECTOMY N/A 8/15/2019    Procedure: APPENDECTOMY, LAPAROSCOPIC, possible Open;  Surgeon: Shila Appiah MD;  Location: [facility] OR     LAPAROSCOPIC HYSTERECTOMY TOTAL  6/08    BSO.  heavy menses, ovarian polyps, fam hx uterine ca     LAPAROSCOPY PROCEDURE UNLISTED  1987     TUBAL LIGATION  2004     ZZC NONSPECIFIC PROCEDURE  1988    cryosurgery     ZZC NONSPECIFIC PROCEDURE  5/98    right breast cyst benign     ZZC NONSPECIFIC PROCEDURE  2001    left     Family History   Problem Relation Age of Onset     Cancer Mother         uterine     Arthritis Mother      Depression Mother      Depression Father      Arthritis Father      Unknown/Adopted Father         limes disease     Respiratory Maternal Grandmother      Alzheimer Disease Maternal Grandfather      Alzheimer Disease Paternal Grandmother      Alcohol/Drug Paternal Grandfather        Objective  /70   Ht 1.6 m (5' 3\")   Wt 71.7 kg (158 lb)   BMI 27.99 kg/m        General: healthy, alert and in no distress      HEENT: no scleral icterus or conjunctival erythema     Skin: no suspicious lesions or rash. No " jaundice.     CV: regular rhythm by palpation, 2+ distal pulses, no pedal edema      Resp: normal respiratory effort without conversational dyspnea     Psych: normal mood and affect      Gait: mildly antalgic, appropriate coordination and balance     Neuro: normal light touch sensory exam of the extremities. Motor strength as noted below     Left Knee exam    ROM:        Flexion 120 degrees       Extension -4 degrees       Range of motion limited by pain, effusion    Inspection:       no visible ecchymosis        effusion noted trace    Skin:       no visible deformities       well perfused       capillary refill brisk    Patellar Motion:        Normal patellar tracking noted through range of motion       Crepitus noted in the patellofemoral joint    Tender:        Most focal today over pes anserine bursa       medial patellar border very mild       medial joint line mild       infrapatellar tendon improved       Lateral hamstring tendon mild/moderate    Non Tender:         remainder of knee area    Special Tests:       Painful Tam       neg (-) varus at 0 deg and 30 deg       neg (-) valgus at 0 deg and 30 deg, pain with valgus stress over medial joint line       Positive pain with forced extension    Evaluation of ipsilateral kinetic chain       normal strength with hip extension and abduction       Decreased right quad activation    Painful ROM of left hip mildly actively, ROM full, + mild FADIR      Radiology    XR Knee Bilat AP W/Lat/Sun/PA Rt11/9/2020  [employer]  Result Narrative   EXAM: XR KNEE BILAT AP W/LAT/SUN/PA RT  LOCATION: [facility]  DATE/TIME: 11/9/2020 4:55 PM    INDICATION: Knee pain for a few weeks, tender along the joint line.  COMPARISON: None.    IMPRESSION:   RIGHT KNEE: Mild patellofemoral compartment right knee osteoarthritis. No acute knee fracture or dislocation. Small right knee joint effusion. Mild joint space narrowing medial compartment right knee. No significant  anterior right knee soft tissue swelling.    LEFT KNEE: Mild joint space narrowing medial compartment left knee.   Other Result Information   Interface, In Rad Results - 11/09/2020  5:31 PM CST  EXAM: XR KNEE BILAT AP W/LAT/SUN/PA RT  LOCATION: [facility] [place]  DATE/TIME: 11/9/2020 4:55 PM    INDICATION: Knee pain for a few weeks, tender along the joint line.  COMPARISON: None.    IMPRESSION:   RIGHT KNEE: Mild patellofemoral compartment right knee osteoarthritis. No acute knee fracture or dislocation. Small right knee joint effusion. Mild joint space narrowing medial compartment right knee. No significant anterior right knee soft tissue swelling.    LEFT KNEE: Mild joint space narrowing medial compartment left knee.     Prior CT abdomen/pelvis reviewed in detail today, showing mild OA with acetabular spurring    Recent Results (from the past 744 hour(s))   MR Knee Right w/o Contrast    Narrative    MR right knee without contrast 12/31/2020 8:33 AM    Techniques: Multiplanar multisequence imaging of the right knee was  obtained without administration of intra-articular or intravenous  contrast using routing protocol.    History: Knee instability; Knee pain, persistent, > 6wks of  conservative tx; known OA with progressive pain and increased  instability; Acute pain of right knee; Primary osteoarthritis of right  knee; Effusion of right knee; Knee instability, right    Additional History from EMR: Acute event November 6 with subsequent  acute knee pain    Comparison: None available    Findings:    MENISCI:  Medial meniscus: There is a near full-thickness tear of the posterior  root attachment of the medial meniscus (series 5, images 19, 18). In  the area of the expected posterior root attachment there is edema and  a small cortical break involving the posterior tibial plateau at the  expected site of the posterior root attachment (series 6, image 18).  There is increased intrasubstance signal with a subtle  horizontal  oblique tear of the posterior horn of the medial meniscus extending  towards the root attachment. The meniscus is 3 mm extruded (series 6,  image 13) blunting of the body of the medial meniscus is noted.  Lateral meniscus: The lateral meniscus is intact and unremarkable.    LIGAMENTS  Cruciate ligaments: Mildly increased signal within the ACL, however,  no evidence of acute tear. Posterior cruciate ligament is intact.  Medial supporting structures: Bowing of the tibial collateral  ligament. Low-grade grade sprain of the proximal attachment site of  the tibial collateral ligament.  Lateral supporting structures: Iliotibial band, lateral collateral  ligament popliteus tendon and biceps femoris tendon are intact and  unremarkable.    EXTENSOR MECHANISM  Preserved.    FLUID  Small joint effusion. No substantial Baker's cyst.    OSSEOUS and ARTICULAR STRUCTURES  Bones: No fracture, contusion, or osseous lesion is seen.    Patellofemoral compartment: Focal full-thickness articular cartilage  fissuring centered about the median ridge and the lateral patella  facet. Moderate grade fissuring in the central aspect of the trochlea.    Medial compartment: Focal areas of moderate to high-grade cartilage  loss in the distal femoral condyle and tibial plateau (series 6, image  12 through 14).   Lateral compartment: Mild superficial cartilage fraying.    ANCILLARY FINDINGS  None.      Impression    Impression:  1. Subacute appearing near full-thickness tear of the posterior root  attachment of the medial meniscus with associated focal edema and  subtle cortical break in the expected attachment side of the posterior  root of the medial meniscus. Subtle horizontal oblique tear and  increased intrasubstance signal extending from the posterior horn into  the posterior root.  2. Extrusion of the medial meniscus, the weightbearing aspect of the  medial compartment is largely meniscal deficient. Associated moderate  to  high-grade focal areas of cartilage loss in the weightbearing  aspect of the medial distal femoral condyle and tibial plateau.  3. Focal areas of moderate to high-grade articular cartilage fissuring  within the patellofemoral joint as described above.  4. Low-grade sprain of the proximal tibial collateral ligament.  5. Small joint effusion.  6. ACL, posterior cruciate ligament, lateral supporting structures are  intact.    JUTTA ELLERMANN, MD       Procedures  Assessment:  1. Primary osteoarthritis of right knee    2. Right knee pain, unspecified chronicity        Plan:  Discussed the assessment with the patient.  Follow up: prn based on clinical progress  XR, MRI and CT images independently visualized and reviewed with patient today in clinic  Reviewed option for ongoing watchful waiting, MRI of knee reviewed showing meniscal root tear and more OA changes than appreciated on XR, and likelihood of direct or indirect relationship of knee pain to recent medication changes   She has underlying wear-and-tear regardless, and still has considerable pain with walking  Trial of US guided aspiration and CSI helpful for some of the knee joint pain, but still difficulty with ambulating comfortably  Most focal pain today over pes anserine bursa  Reviewed option for pes anserine injection  After review of MRI, she would like to discuss TKA vs other options with orthopedic surgery, referral placed  Defer injection for now as it will likely affect timing of potential surgery  Strategies for  vs compression brace also reviewed  PT offered and ordered, basic HEP reviewed  Assistive device options like cane reviewed  Temporary handicap parking permit provided, reviewed appropriate use  Reviewed safe and appropriate OTC medication choices, try tylenol first  Also consider trying voltaren gel topically, up to QID prn  Up to 3000mg daily of tylenol is generally safe, NSAID dosing and duration limitations reviewed  Supportive  care reviewed  All questions were answered today  Contact us with additional questions or concerns  Signs and sx of concern reviewed      Tomasz Enrique DO, CAJONAS  Primary Care Sports Medicine  [facility]               Disclaimer: This note consists of symbols derived from keyboarding, dictation and/or voice recognition software. As a result, there may be errors in the script that have gone undetected. Please consider this when interpreting information found in this chart.        Again, thank you for allowing me to participate in the care of your patient.        Sincerely,        Tomasz Enrique DO

## 2021-01-14 ENCOUNTER — THERAPY VISIT (OUTPATIENT)
Dept: PHYSICAL THERAPY | Facility: CLINIC | Age: 56
End: 2021-01-14
Payer: COMMERCIAL

## 2021-01-14 DIAGNOSIS — M25.561 ACUTE PAIN OF RIGHT KNEE: ICD-10-CM

## 2021-01-14 PROCEDURE — 97110 THERAPEUTIC EXERCISES: CPT | Mod: GP | Performed by: PHYSICAL THERAPIST

## 2021-01-15 ENCOUNTER — RX ONLY (RX ONLY)
Age: 56
End: 2021-01-15

## 2021-01-15 ENCOUNTER — HEALTH MAINTENANCE LETTER (OUTPATIENT)
Age: 56
End: 2021-01-15

## 2021-01-15 RX ORDER — VALACYCLOVIR 500 MG/1
500MG TABLET, FILM COATED ORAL ONCE A DAY
Qty: 90 | Refills: 3 | Status: ERX

## 2021-01-15 RX ORDER — VALACYCLOVIR 1 G/1
TABLET, FILM COATED ORAL AS DIRECTED
Qty: 20 | Refills: 0 | Status: ERX

## 2021-01-21 ENCOUNTER — APPOINTMENT (OUTPATIENT)
Dept: URBAN - METROPOLITAN AREA CLINIC 252 | Age: 56
Setting detail: DERMATOLOGY
End: 2021-01-21

## 2021-01-21 DIAGNOSIS — D22 MELANOCYTIC NEVI: ICD-10-CM

## 2021-01-21 DIAGNOSIS — B00.1 HERPESVIRAL VESICULAR DERMATITIS: ICD-10-CM

## 2021-01-21 PROBLEM — D22.39 MELANOCYTIC NEVI OF OTHER PARTS OF FACE: Status: ACTIVE | Noted: 2021-01-21

## 2021-01-21 PROCEDURE — OTHER COUNSELING: OTHER

## 2021-01-21 PROCEDURE — 99212 OFFICE O/P EST SF 10 MIN: CPT

## 2021-01-21 ASSESSMENT — LOCATION SIMPLE DESCRIPTION DERM
LOCATION SIMPLE: RIGHT LIP
LOCATION SIMPLE: LEFT CHEEK

## 2021-01-21 ASSESSMENT — LOCATION ZONE DERM
LOCATION ZONE: LIP
LOCATION ZONE: FACE

## 2021-01-21 ASSESSMENT — LOCATION DETAILED DESCRIPTION DERM
LOCATION DETAILED: LEFT INFERIOR CENTRAL MALAR CHEEK
LOCATION DETAILED: RIGHT UPPER CUTANEOUS LIP

## 2021-01-21 NOTE — PROCEDURE: COUNSELING
Detail Level: Detailed
Patient Specific Counseling (Will Not Stick From Patient To Patient): Continue 500mg qd and same as needed treated with 2g at first tingle and 2 g 12 hours later.
Detail Level: Simple

## 2021-01-21 NOTE — HPI: INFECTION (HERPES SIMPLEX)
Is This A New Presentation, Or A Follow-Up?: Follow Up Herpes Simplex
Additional History: Takes valtrex 500mg qd for maintenance and 2000mg at first tingle and 2000mg 12 hours later for flares. Denies side effects. She reports getting a flare for the first time in 4 months. Got 3-4 flares with maintenance and she would get 6-10 without maintenance dosing.

## 2021-01-28 ENCOUNTER — THERAPY VISIT (OUTPATIENT)
Dept: PHYSICAL THERAPY | Facility: CLINIC | Age: 56
End: 2021-01-28
Payer: COMMERCIAL

## 2021-01-28 DIAGNOSIS — M25.561 ACUTE PAIN OF RIGHT KNEE: ICD-10-CM

## 2021-01-28 PROCEDURE — 97110 THERAPEUTIC EXERCISES: CPT | Mod: GP | Performed by: PHYSICAL THERAPIST

## 2021-01-28 NOTE — PROGRESS NOTES
Subjective:  HPI  Physical Exam                    Objective:  System    Physical Exam    General     ROS    Assessment/Plan:    DISCHARGE REPORT    Progress reporting period is from 12/10/2020 to 1/28/2021.       SUBJECTIVE  Subjective changes noted by patient:   Subjective: Patient reports being scheduled for her knee replacement surgery in two weeks. She reports [facility]     Current pain level is  Current Pain level: 2/10.     Previous pain level was   Initial Pain level: 9/10.   Changes in function:  Yes (See Goal flowsheet attached for changes in current functional level)  Adverse reaction to treatment or activity: None    OBJECTIVE  Changes noted in objective findings:  Yes,   Objective: Patient continues to ambulate with a single axillary crutch and knee brace. AROM 2-0-115 deg. Good quad set. significant tenderness over the medial joint line. Knee strength grossly 4/5 on right.      ASSESSMENT/PLAN  Updated problem list and treatment plan: Diagnosis 1:  Right knee pain  Pain -  home program  Decreased ROM/flexibility - home program  Decreased strength - home program  Impaired gait - home program  Decreased function - home program  STG/LTGs have been met or progress has been made towards goals:  Yes (See Goal flow sheet completed today.)  Assessment of Progress: The patient's progress has plateaued.  Self Management Plans:  Patient has been instructed in a home treatment program.  Patient  has been instructed in self management of symptoms.  I have re-evaluated this patient and find that the nature, scope, duration and intensity of the therapy is appropriate for the medical condition of the patient.  Toshia continues to require the following intervention to meet STG and LTG's:  PT    Recommendations:  Patient will return to physical therapy following her knee replacement surgery in February 2021    Please refer to  the daily flowsheet for treatment today, total treatment time and time spent performing 1:1 timed codes.

## 2021-02-09 NOTE — PROGRESS NOTES
Received records from [facility].  Notes indicate that neurology was previously prescribing her tramadol.  They referred her to a pain clinic (records not available) who would not take over tramadol Rx.  At one point the tramadol was stopped, but her old PCP restarted it.  Records scanned into chart.

## 2021-02-17 ENCOUNTER — RX ONLY (RX ONLY)
Age: 56
End: 2021-02-17

## 2021-02-17 RX ORDER — VALACYCLOVIR 1 G/1
TABLET, FILM COATED ORAL
Qty: 20 | Refills: 0 | Status: ERX | COMMUNITY
Start: 2021-02-17

## 2021-05-14 ENCOUNTER — TELEPHONE (OUTPATIENT)
Dept: FAMILY MEDICINE | Facility: CLINIC | Age: 56
End: 2021-05-14
Payer: COMMERCIAL

## 2021-05-14 NOTE — LETTER
May 14, 2021      Toshia Savage  [address] [address]      Your healthcare team cares about your health. To provide you with the best care,   we have reviewed your chart and based on our findings, we see that you are due to:     - ASTHMA FOLLOW UP:  Complete and return the attached Asthma Control Test.  If your total score is 19 or less or you have been to the ER or urgent care for your asthma, then please schedule an asthma follow-up appointment.  - BREAST CANCER SCREENING:  Schedule Annual Mammogram. [facility] scheduling number - [phone] or schedule in MyChart (self referall)  - CERVICAL CANCER SCREENING: Schedule a Cervical Cancer Screening, with Pap and wellness exam.   - COLON CANCER SCREENING:  Call or mychart the clinic to schedule your colonoscopy or schedule/ your FIT Test, or Cologuard test    If you have already completed these items, please contact the clinic via phone or   Mychart so your care team can review and update your records. Thank you for   choosing [facility] for your healthcare needs. For any questions,   concerns, or to schedule an appointment please contact the clinic.       Healthy Regards,      Your [facility] Care Team

## 2021-05-14 NOTE — TELEPHONE ENCOUNTER
Patient Quality Outreach      Summary:    Patient has the following on her problem list/HM:     Depression / Dysthymia review    6 Month Remission: 4-8 month window range:   12 Month Remission: 10-14 month window range:        PHQ-9 SCORE 4/13/2011 11/8/2011 5/8/2012   PHQ-9 Total Score 17 7 -   PHQ-9 Total Score - - 6       If PHQ-9 recheck is 5 or more, route to provider for next steps.    Asthma review     No flowsheet data found.     IVD     ASA: FAILED    Last LDL:    No results found for: CHOL  No results found for: HDL  No results found for: LDL  No results found for: TRIG   No results found for: CHOLHDLRATIO     Is the patient on a Statin? No   Is the patient on Aspirin? No                    Last three blood pressure readings:  BP Readings from Last 3 Encounters:   01/08/21 115/70   12/30/20 (!) 152/94   11/25/20 (P) 124/73        Tobacco History:       Tobacco Use      Smoking status: Current Every Day Smoker        Packs/day: 0.50        Years: 31.00        Pack years: 15.5        Types: Cigarettes      Smokeless tobacco: Never Used      Hypertension   Last three blood pressure readings:  BP Readings from Last 3 Encounters:   01/08/21 115/70   12/30/20 (!) 152/94   11/25/20 (P) 124/73     Blood pressure: Failed    HTN Guidelines:  ? 139/89     Patient is due/failing the following:   Asthma follow-up visit, Colonoscopy, Breast Cancer Screening - Mammogram, PHQ-9 Needed and LDL (Fasting)    Type of outreach:    Sent letter.    Questions for provider review:    None                                                                                                                                     Yanira Joseph MA     Chart routed to Care Team.

## 2021-07-29 ENCOUNTER — APPOINTMENT (OUTPATIENT)
Dept: URBAN - METROPOLITAN AREA CLINIC 252 | Age: 56
Setting detail: DERMATOLOGY
End: 2021-07-29

## 2021-07-29 VITALS — RESPIRATION RATE: 16 BRPM | WEIGHT: 160 LBS | HEIGHT: 63 IN

## 2021-07-29 DIAGNOSIS — L81.7 PIGMENTED PURPURIC DERMATOSIS: ICD-10-CM

## 2021-07-29 DIAGNOSIS — L28.1 PRURIGO NODULARIS: ICD-10-CM

## 2021-07-29 PROBLEM — D23.71 OTHER BENIGN NEOPLASM OF SKIN OF RIGHT LOWER LIMB, INCLUDING HIP: Status: ACTIVE | Noted: 2021-07-29

## 2021-07-29 PROCEDURE — OTHER COUNSELING: OTHER

## 2021-07-29 PROCEDURE — 99212 OFFICE O/P EST SF 10 MIN: CPT | Mod: 25

## 2021-07-29 PROCEDURE — OTHER INTRALESIONAL KENALOG: OTHER

## 2021-07-29 PROCEDURE — 17110 DESTRUCT B9 LESION 1-14: CPT

## 2021-07-29 PROCEDURE — OTHER LIQUID NITROGEN: OTHER

## 2021-07-29 ASSESSMENT — LOCATION SIMPLE DESCRIPTION DERM
LOCATION SIMPLE: RIGHT PRETIBIAL REGION
LOCATION SIMPLE: RIGHT THIGH

## 2021-07-29 ASSESSMENT — LOCATION DETAILED DESCRIPTION DERM
LOCATION DETAILED: RIGHT ANTERIOR DISTAL THIGH
LOCATION DETAILED: RIGHT DISTAL PRETIBIAL REGION

## 2021-07-29 ASSESSMENT — LOCATION ZONE DERM: LOCATION ZONE: LEG

## 2021-07-29 NOTE — HPI: SKIN LESION
How Severe Is Your Skin Lesion?: mild
Has Your Skin Lesion Been Treated?: not been treated
Is This A New Presentation, Or A Follow-Up?: Skin Lesion
Additional History: Picks and scratches a few times per day.  Was a little painful and itchy initially.

## 2021-07-29 NOTE — PROCEDURE: LIQUID NITROGEN
Render Post-Care Instructions In Note?: yes
Medical Necessity Information: It is in your best interest to select a reason for this procedure from the list below. All of these items fulfill various CMS LCD requirements except the new and changing color options.
Add 52 Modifier (Optional): no
Consent: - Verbal and written consent was obtained, and risks were reviewed prior to procedure today. \\n- Risks discussed include but are not limited to pain, crusting, scabbing, blistering, scarring, temporary or permanent darker or lighter pigmentary change, recurrence, incomplete resolution, and infection.
Medical Necessity Clause: This procedure was medically necessary because the lesions that were treated were:
Detail Level: Detailed
Post-Care Instructions: - Avoid picking at any of the treated lesions.\\n- Blisters should not be popped. However should a blister rupture, cover it with Vaseline ointment or Aquaphor and a bandage until healed.

## 2021-07-29 NOTE — PROCEDURE: COUNSELING
Detail Level: Detailed
Patient Specific Counseling (Will Not Stick From Patient To Patient): - Discussed and recommended intralesional kenalog injections and cryosurgery.\\n- Sample of Cerave itch relief lotion given to patient.
Patient Specific Counseling (Will Not Stick From Patient To Patient): Recommended compression stockings if her minimal remaining edema does not resolve completely. Return to clinic if this worsens or symptoms develop. Patient expressed understanding.

## 2021-07-29 NOTE — HPI: RASH
Is This A New Presentation, Or A Follow-Up?: Rash
Additional History: Had a lot of swelling from an injury.  She reports factor 2 and is on warfarin.

## 2021-08-20 ENCOUNTER — TELEPHONE (OUTPATIENT)
Dept: ORTHOPEDICS | Facility: CLINIC | Age: 56
End: 2021-08-20

## 2021-08-20 NOTE — TELEPHONE ENCOUNTER
"Reviewed patient's chart prior to her appointment on 9/8/21 with Dr Enrique.  Per scheduling note - appointment is for \"right knee draining\".  Reviewed patient's chart she had right total knee arthroplasty completed 2/16/21 @ [facility] by Itz Rodriguez.  It appears that following OV on 8/12/21 that ultrasound guided knee aspiration with Alpha Defensin pack test was ordered.      [facility] for return call to discuss her appointment.  Need to confirm this plan with patient and review with [facility] that they have this type of testing available.  Will await return phone call from patient to discuss.    Donell Duckworth, ATC  "

## 2021-08-23 NOTE — TELEPHONE ENCOUNTER
Patient LVM returning Rangel's call from Dr. Enrique's team  She can be reached at   Mariajose Pacheco MS ATC

## 2021-09-17 ENCOUNTER — OFFICE VISIT (OUTPATIENT)
Dept: ORTHOPEDICS | Facility: CLINIC | Age: 56
End: 2021-09-17
Payer: COMMERCIAL

## 2021-09-17 VITALS
WEIGHT: 160 LBS | SYSTOLIC BLOOD PRESSURE: 122 MMHG | BODY MASS INDEX: 28.35 KG/M2 | HEIGHT: 63 IN | DIASTOLIC BLOOD PRESSURE: 76 MMHG

## 2021-09-17 DIAGNOSIS — M25.462 SWELLING OF JOINT, KNEE, LEFT: ICD-10-CM

## 2021-09-17 DIAGNOSIS — G89.29 CHRONIC PAIN OF LEFT KNEE: Primary | ICD-10-CM

## 2021-09-17 DIAGNOSIS — M25.562 CHRONIC PAIN OF LEFT KNEE: Primary | ICD-10-CM

## 2021-09-17 DIAGNOSIS — Z96.652 HX OF TOTAL KNEE REPLACEMENT, LEFT: ICD-10-CM

## 2021-09-17 PROCEDURE — 99213 OFFICE O/P EST LOW 20 MIN: CPT | Performed by: FAMILY MEDICINE

## 2021-09-17 ASSESSMENT — MIFFLIN-ST. JEOR: SCORE: 1279.89

## 2021-09-17 NOTE — LETTER
2021         RE: Tsohia Savage  [address] [address]        Dear Colleague,    Thank you for referring your patient, Toshia Savage, to the [facility]. Please see a copy of my visit note below.    Toshia Savage  :  1965  DOS: 21  MRN: [id number]    Sports Medicine Clinic Visit    PCP: Arin Horne    Toshia Savage is a 55 year old female who is seen in consultation at the request of  Ny Lu D.O. presenting with right knee.    Injury: Insidious gradual onset ~1 month(s).  Pain located over right anterior, deep and posterior knee, nonradiating.  Additional Features:  Positive: swelling, locking, instability and aching dull pain with intermitted sharp pain.  Symptoms are better with Rest and Elevation, brace.  Symptoms are worse with: Transitions, stairs, kneeling and squatting.  Other evaluation and/or treatments so far consists of: Ibuprofen, Rest, Elevation and brace.  Recent imaging completed: X-rays completed 20.  Prior History of related problems: None. Has had multiple cortisone shots in past ~3 years and currently being tested for Cushings.    Social History: Part-time retail work, recently retired from teaching    Interim History - 2020  Since last visit on 2020 patient has moderate right knee and left hip pain.  Patient reports that her generalized joint pain and bilateral ankle swelling is much improved after discontinuing Zetia medication in 20.  She wakes with minimal discomfort, but notes that right knee pain worsens through out the day. No interim injury.       Interim History - 2021  Since last visit on 2020 patient has moderate-severe right knee pain.  Right knee steroid injection and aspiration completed on 20 provided minimal relief, she reported no relief day of procedure.  She is ambulating with single crutch today.  No new  injury in the interim.    Interim History - September 17, 2021  Since last visit on 1/8/21 patient has moderate-severe right knee pain and swelling.  Patient is currently status post right total knee arthroplasty with Itz Rodriguez MD @ [facility] on 2/16/21.  Patient had several post-op complications and continued swelling of right knee.  She notes that right knee aspiration with joint/Synovasure culture was completed in late 08/2021 by Dr Powers.  This was negative.  She wanting second-opinion on her continued post-op pain and swelling.  No interim injury.       Review of Systems  Musculoskeletal: as above  Remainder of review of systems is negative including constitutional, CV, pulmonary, GI, Skin and Neurologic except as noted in HPI or medical history.    Past Medical History:   Diagnosis Date     Anorexia nervosas 11/96    has since 10th grade     Asthma      Chemical dependency (H)     marijuana      Chronic back pain     chronic tranadol     Concussion 10/11/94     Cyst (solitary) of breast 5/98    right - removed     Depression with anxiety 04    treated     Exercise-induced asthma 11/8/2011     Herpes labialis      Hypothyroid      Migraine 1989    followed by Neuro     Pneumonia 8/99    hosp x 4days     Reflex sympathetic dystrophy     left foot     Vitamin D deficiency      Past Surgical History:   Procedure Laterality Date     COLONOSCOPY  5-13-11     LAPAROSCOPIC APPENDECTOMY N/A 8/15/2019    Procedure: APPENDECTOMY, LAPAROSCOPIC, possible Open;  Surgeon: Shila Appiah MD;  Location: [facility] OR     LAPAROSCOPIC HYSTERECTOMY TOTAL  6/08    BSO.  heavy menses, ovarian polyps, fam hx uterine ca     LAPAROSCOPY PROCEDURE UNLISTED  1987     TUBAL LIGATION  2004     ZZC NONSPECIFIC PROCEDURE  1988    cryosurgery     ZZ NONSPECIFIC PROCEDURE  5/98    right breast cyst benign     Z NONSPECIFIC PROCEDURE  2001    left     Family History   Problem Relation Age of Onset     Cancer Mother          "uterine     Arthritis Mother      Depression Mother      Depression Father      Arthritis Father      Unknown/Adopted Father         limes disease     Respiratory Maternal Grandmother      Alzheimer Disease Maternal Grandfather      Alzheimer Disease Paternal Grandmother      Alcohol/Drug Paternal Grandfather        Objective  /76   Ht 1.6 m (5' 3\")   Wt 72.6 kg (160 lb)   BMI 28.34 kg/m        General: healthy, alert and in no distress      HEENT: no scleral icterus or conjunctival erythema     Skin: no suspicious lesions or rash. No jaundice.     CV: regular rhythm by palpation, 2+ distal pulses, no pedal edema      Resp: normal respiratory effort without conversational dyspnea     Psych: normal mood and affect      Gait: mildly antalgic, appropriate coordination and balance     Neuro: normal light touch sensory exam of the extremities. Motor strength as noted below     Left Knee exam    ROM:        Flexion ~130 degrees       Extension -3 degrees       Range of motion limited by mild pain in terminal ROM    Inspection:       no visible ecchymosis        effusion noted trace/small, minimal warmth, no redness or induration    Skin:       no visible deformities       well perfused       capillary refill brisk    Patellar Motion:        Normal patellar tracking noted through range of motion       Crepitus noted in the patellofemoral joint    Tender:        medial patellar border very mild       medial and lateral joint line mild    Non Tender:         remainder of knee area    Special Tests:        neg (-) varus and valgus stress    Evaluation of ipsilateral kinetic chain       normal strength with hip extension and abduction       Mildly decreased left quad activation    Radiology    XR Knee Bilat AP W/Lat/Sun/PA Rt11/9/2020  [facility]  Result Narrative   EXAM: XR KNEE BILAT AP W/LAT/SUN/PA RT  LOCATION: [facility]  DATE/TIME: 11/9/2020 4:55 PM    INDICATION: Knee pain for a few weeks, tender " along the joint line.  COMPARISON: None.    IMPRESSION:   RIGHT KNEE: Mild patellofemoral compartment right knee osteoarthritis. No acute knee fracture or dislocation. Small right knee joint effusion. Mild joint space narrowing medial compartment right knee. No significant anterior right knee soft tissue swelling.    LEFT KNEE: Mild joint space narrowing medial compartment left knee.   Other Result Information   Interface, In Rad Results - 11/09/2020  5:31 PM CST  EXAM: XR KNEE BILAT AP W/LAT/SUN/PA RT  LOCATION: [facility]  DATE/TIME: 11/9/2020 4:55 PM    INDICATION: Knee pain for a few weeks, tender along the joint line.  COMPARISON: None.    IMPRESSION:   RIGHT KNEE: Mild patellofemoral compartment right knee osteoarthritis. No acute knee fracture or dislocation. Small right knee joint effusion. Mild joint space narrowing medial compartment right knee. No significant anterior right knee soft tissue swelling.    LEFT KNEE: Mild joint space narrowing medial compartment left knee.     Prior CT abdomen/pelvis reviewed in detail today, showing mild OA with acetabular spurring    Recent Results (from the past 744 hour(s))   MR Knee Right w/o Contrast    Narrative    MR right knee without contrast 12/31/2020 8:33 AM    Techniques: Multiplanar multisequence imaging of the right knee was  obtained without administration of intra-articular or intravenous  contrast using routing protocol.    History: Knee instability; Knee pain, persistent, > 6wks of  conservative tx; known OA with progressive pain and increased  instability; Acute pain of right knee; Primary osteoarthritis of right  knee; Effusion of right knee; Knee instability, right    Additional History from EMR: Acute event November 6 with subsequent  acute knee pain    Comparison: None available    Findings:    MENISCI:  Medial meniscus: There is a near full-thickness tear of the posterior  root attachment of the medial meniscus (series 5, images 19, 18).  In  the area of the expected posterior root attachment there is edema and  a small cortical break involving the posterior tibial plateau at the  expected site of the posterior root attachment (series 6, image 18).  There is increased intrasubstance signal with a subtle horizontal  oblique tear of the posterior horn of the medial meniscus extending  towards the root attachment. The meniscus is 3 mm extruded (series 6,  image 13) blunting of the body of the medial meniscus is noted.  Lateral meniscus: The lateral meniscus is intact and unremarkable.    LIGAMENTS  Cruciate ligaments: Mildly increased signal within the ACL, however,  no evidence of acute tear. Posterior cruciate ligament is intact.  Medial supporting structures: Bowing of the tibial collateral  ligament. Low-grade grade sprain of the proximal attachment site of  the tibial collateral ligament.  Lateral supporting structures: Iliotibial band, lateral collateral  ligament popliteus tendon and biceps femoris tendon are intact and  unremarkable.    EXTENSOR MECHANISM  Preserved.    FLUID  Small joint effusion. No substantial Baker's cyst.    OSSEOUS and ARTICULAR STRUCTURES  Bones: No fracture, contusion, or osseous lesion is seen.    Patellofemoral compartment: Focal full-thickness articular cartilage  fissuring centered about the median ridge and the lateral patella  facet. Moderate grade fissuring in the central aspect of the trochlea.    Medial compartment: Focal areas of moderate to high-grade cartilage  loss in the distal femoral condyle and tibial plateau (series 6, image  12 through 14).   Lateral compartment: Mild superficial cartilage fraying.    ANCILLARY FINDINGS  None.      Impression    Impression:  1. Subacute appearing near full-thickness tear of the posterior root  attachment of the medial meniscus with associated focal edema and  subtle cortical break in the expected attachment side of the posterior  root of the medial meniscus. Subtle  horizontal oblique tear and  increased intrasubstance signal extending from the posterior horn into  the posterior root.  2. Extrusion of the medial meniscus, the weightbearing aspect of the  medial compartment is largely meniscal deficient. Associated moderate  to high-grade focal areas of cartilage loss in the weightbearing  aspect of the medial distal femoral condyle and tibial plateau.  3. Focal areas of moderate to high-grade articular cartilage fissuring  within the patellofemoral joint as described above.  4. Low-grade sprain of the proximal tibial collateral ligament.  5. Small joint effusion.  6. ACL, posterior cruciate ligament, lateral supporting structures are  intact.    JUTTA ELLERMANN, MD       Assessment:  1. Chronic pain of left knee    2. Hx of total knee replacement, left    3. Swelling of joint, knee, left        Plan:  Discussed the assessment with the patient.  Follow up: prn based on clinical progress, and as directed by orthopedic surgery team managing her TKA  I was very explicit that I could not offer a formal opinion about her surgery, as I am not a surgeon, she was appreciative of the conversation and evaluation  Prior notes and imaging reports from [facility] reviewed  Reassuring Synovasure testing for potential infection, reviewed workup thus far and encouraging results  Encouraged ongoing compliance with PT regimen, HEP, core and lower extremity conditioning, she will return to the pool  Supportive care reviewed  All questions were answered today  Contact us with additional questions or concerns  Signs and sx of concern reviewed      Tomasz Enrique DO, WALE  Primary Care Sports Medicine  [facility]               Disclaimer: This note consists of symbols derived from keyboarding, dictation and/or voice recognition software. As a result, there may be errors in the script that have gone undetected. Please consider this when interpreting information found in this  chart.        Again, thank you for allowing me to participate in the care of your patient.        Sincerely,        Tomasz Enrique, DO

## 2021-09-17 NOTE — PROGRESS NOTES
Toshia Savage  :  1965  DOS: 21  MRN: [id number]    Sports Medicine Clinic Visit    PCP: Arin Horne Hussein is a 55 year old female who is seen in consultation at the request of  Ny Lu D.O. presenting with right knee.    Injury: Insidious gradual onset ~1 month(s).  Pain located over right anterior, deep and posterior knee, nonradiating.  Additional Features:  Positive: swelling, locking, instability and aching dull pain with intermitted sharp pain.  Symptoms are better with Rest and Elevation, brace.  Symptoms are worse with: Transitions, stairs, kneeling and squatting.  Other evaluation and/or treatments so far consists of: Ibuprofen, Rest, Elevation and brace.  Recent imaging completed: X-rays completed 20.  Prior History of related problems: None. Has had multiple cortisone shots in past ~3 years and currently being tested for Cushings.    Social History: Part-time retail work, recently retired from teaching    Interim History - 2020  Since last visit on 2020 patient has moderate right knee and left hip pain.  Patient reports that her generalized joint pain and bilateral ankle swelling is much improved after discontinuing Zetia medication in 20.  She wakes with minimal discomfort, but notes that right knee pain worsens through out the day. No interim injury.       Interim History - 2021  Since last visit on 2020 patient has moderate-severe right knee pain.  Right knee steroid injection and aspiration completed on 20 provided minimal relief, she reported no relief day of procedure.  She is ambulating with single crutch today.  No new injury in the interim.    Interim History - 2021  Since last visit on 21 patient has moderate-severe right knee pain and swelling.  Patient is currently status post right total knee arthroplasty with Itz Rodriguez MD @ [facility] on 21.  Patient had  several post-op complications and continued swelling of right knee.  She notes that right knee aspiration with joint/Synovasure culture was completed in late 08/2021 by Dr Powers.  This was negative.  She wanting second-opinion on her continued post-op pain and swelling.  No interim injury.       Review of Systems  Musculoskeletal: as above  Remainder of review of systems is negative including constitutional, CV, pulmonary, GI, Skin and Neurologic except as noted in HPI or medical history.    Past Medical History:   Diagnosis Date     Anorexia nervosas 11/96    has since 10th grade     Asthma      Chemical dependency (H)     marijuana      Chronic back pain     chronic tranadol     Concussion 10/11/94     Cyst (solitary) of breast 5/98    right - removed     Depression with anxiety 04    treated     Exercise-induced asthma 11/8/2011     Herpes labialis      Hypothyroid      Migraine 1989    followed by Neuro     Pneumonia 8/99    hosp x 4days     Reflex sympathetic dystrophy     left foot     Vitamin D deficiency      Past Surgical History:   Procedure Laterality Date     COLONOSCOPY  5-13-11     LAPAROSCOPIC APPENDECTOMY N/A 8/15/2019    Procedure: APPENDECTOMY, LAPAROSCOPIC, possible Open;  Surgeon: Shila Appiah MD;  Location: [facility] OR     LAPAROSCOPIC HYSTERECTOMY TOTAL  6/08    BSO.  heavy menses, ovarian polyps, fam hx uterine ca     LAPAROSCOPY PROCEDURE UNLISTED  1987     TUBAL LIGATION  2004     ZZC NONSPECIFIC PROCEDURE  1988    cryosurgery     ZZC NONSPECIFIC PROCEDURE  5/98    right breast cyst benign     Z NONSPECIFIC PROCEDURE  2001    left     Family History   Problem Relation Age of Onset     Cancer Mother         uterine     Arthritis Mother      Depression Mother      Depression Father      Arthritis Father      Unknown/Adopted Father         limes disease     Respiratory Maternal Grandmother      Alzheimer Disease Maternal Grandfather      Alzheimer Disease Paternal Grandmother       "Alcohol/Drug Paternal Grandfather        Objective  /76   Ht 1.6 m (5' 3\")   Wt 72.6 kg (160 lb)   BMI 28.34 kg/m        General: healthy, alert and in no distress      HEENT: no scleral icterus or conjunctival erythema     Skin: no suspicious lesions or rash. No jaundice.     CV: regular rhythm by palpation, 2+ distal pulses, no pedal edema      Resp: normal respiratory effort without conversational dyspnea     Psych: normal mood and affect      Gait: mildly antalgic, appropriate coordination and balance     Neuro: normal light touch sensory exam of the extremities. Motor strength as noted below     Left Knee exam    ROM:        Flexion ~130 degrees       Extension -3 degrees       Range of motion limited by mild pain in terminal ROM    Inspection:       no visible ecchymosis        effusion noted trace/small, minimal warmth, no redness or induration    Skin:       no visible deformities       well perfused       capillary refill brisk    Patellar Motion:        Normal patellar tracking noted through range of motion       Crepitus noted in the patellofemoral joint    Tender:        medial patellar border very mild       medial and lateral joint line mild    Non Tender:         remainder of knee area    Special Tests:        neg (-) varus and valgus stress    Evaluation of ipsilateral kinetic chain       normal strength with hip extension and abduction       Mildly decreased left quad activation    Radiology    XR Knee Bilat AP W/Lat/Sun/PA Rt11/9/2020  [facility]  Result Narrative   EXAM: XR KNEE BILAT AP W/LAT/SUN/PA RT  LOCATION: [facility]  DATE/TIME: 11/9/2020 4:55 PM    INDICATION: Knee pain for a few weeks, tender along the joint line.  COMPARISON: None.    IMPRESSION:   RIGHT KNEE: Mild patellofemoral compartment right knee osteoarthritis. No acute knee fracture or dislocation. Small right knee joint effusion. Mild joint space narrowing medial compartment right knee. No significant " anterior right knee soft tissue swelling.    LEFT KNEE: Mild joint space narrowing medial compartment left knee.   Other Result Information   Interface, In Rad Results - 11/09/2020  5:31 PM CST  EXAM: XR KNEE BILAT AP W/LAT/SUN/PA RT  LOCATION: [facility]  DATE/TIME: 11/9/2020 4:55 PM    INDICATION: Knee pain for a few weeks, tender along the joint line.  COMPARISON: None.    IMPRESSION:   RIGHT KNEE: Mild patellofemoral compartment right knee osteoarthritis. No acute knee fracture or dislocation. Small right knee joint effusion. Mild joint space narrowing medial compartment right knee. No significant anterior right knee soft tissue swelling.    LEFT KNEE: Mild joint space narrowing medial compartment left knee.     Prior CT abdomen/pelvis reviewed in detail today, showing mild OA with acetabular spurring    Recent Results (from the past 744 hour(s))   MR Knee Right w/o Contrast    Narrative    MR right knee without contrast 12/31/2020 8:33 AM    Techniques: Multiplanar multisequence imaging of the right knee was  obtained without administration of intra-articular or intravenous  contrast using routing protocol.    History: Knee instability; Knee pain, persistent, > 6wks of  conservative tx; known OA with progressive pain and increased  instability; Acute pain of right knee; Primary osteoarthritis of right  knee; Effusion of right knee; Knee instability, right    Additional History from EMR: Acute event November 6 with subsequent  acute knee pain    Comparison: None available    Findings:    MENISCI:  Medial meniscus: There is a near full-thickness tear of the posterior  root attachment of the medial meniscus (series 5, images 19, 18). In  the area of the expected posterior root attachment there is edema and  a small cortical break involving the posterior tibial plateau at the  expected site of the posterior root attachment (series 6, image 18).  There is increased intrasubstance signal with a subtle  horizontal  oblique tear of the posterior horn of the medial meniscus extending  towards the root attachment. The meniscus is 3 mm extruded (series 6,  image 13) blunting of the body of the medial meniscus is noted.  Lateral meniscus: The lateral meniscus is intact and unremarkable.    LIGAMENTS  Cruciate ligaments: Mildly increased signal within the ACL, however,  no evidence of acute tear. Posterior cruciate ligament is intact.  Medial supporting structures: Bowing of the tibial collateral  ligament. Low-grade grade sprain of the proximal attachment site of  the tibial collateral ligament.  Lateral supporting structures: Iliotibial band, lateral collateral  ligament popliteus tendon and biceps femoris tendon are intact and  unremarkable.    EXTENSOR MECHANISM  Preserved.    FLUID  Small joint effusion. No substantial Baker's cyst.    OSSEOUS and ARTICULAR STRUCTURES  Bones: No fracture, contusion, or osseous lesion is seen.    Patellofemoral compartment: Focal full-thickness articular cartilage  fissuring centered about the median ridge and the lateral patella  facet. Moderate grade fissuring in the central aspect of the trochlea.    Medial compartment: Focal areas of moderate to high-grade cartilage  loss in the distal femoral condyle and tibial plateau (series 6, image  12 through 14).   Lateral compartment: Mild superficial cartilage fraying.    ANCILLARY FINDINGS  None.      Impression    Impression:  1. Subacute appearing near full-thickness tear of the posterior root  attachment of the medial meniscus with associated focal edema and  subtle cortical break in the expected attachment side of the posterior  root of the medial meniscus. Subtle horizontal oblique tear and  increased intrasubstance signal extending from the posterior horn into  the posterior root.  2. Extrusion of the medial meniscus, the weightbearing aspect of the  medial compartment is largely meniscal deficient. Associated moderate  to  high-grade focal areas of cartilage loss in the weightbearing  aspect of the medial distal femoral condyle and tibial plateau.  3. Focal areas of moderate to high-grade articular cartilage fissuring  within the patellofemoral joint as described above.  4. Low-grade sprain of the proximal tibial collateral ligament.  5. Small joint effusion.  6. ACL, posterior cruciate ligament, lateral supporting structures are  intact.    JUTTA ELLERMANN, MD       Assessment:  1. Chronic pain of left knee    2. Hx of total knee replacement, left    3. Swelling of joint, knee, left        Plan:  Discussed the assessment with the patient.  Follow up: prn based on clinical progress, and as directed by orthopedic surgery team managing her TKA  I was very explicit that I could not offer a formal opinion about her surgery, as I am not a surgeon, she was appreciative of the conversation and evaluation  Prior notes and imaging reports from [facility] reviewed  Reassuring Synovasure testing for potential infection, reviewed workup thus far and encouraging results  Encouraged ongoing compliance with PT regimen, HEP, core and lower extremity conditioning, she will return to the pool  Supportive care reviewed  All questions were answered today  Contact us with additional questions or concerns  Signs and sx of concern reviewed      Tomasz Enrique DO, WALE  Primary Care Sports Medicine  [facility]               Disclaimer: This note consists of symbols derived from keyboarding, dictation and/or voice recognition software. As a result, there may be errors in the script that have gone undetected. Please consider this when interpreting information found in this chart.

## 2021-09-17 NOTE — TELEPHONE ENCOUNTER
Patient Quality Outreach 2nd Attempt      Summary:    Type of outreach:    Phone, left message for patient/parent to call back.    Next Steps:  Reach out within 90 days via [place].    Max number of attempts reached: No. Will try again in 90 days if patient still on fail list.    Questions for provider review:    None                                                                                                                    Yanira See PABLITO Joseph     Chart routed to Care Team.

## 2021-10-24 ENCOUNTER — HEALTH MAINTENANCE LETTER (OUTPATIENT)
Age: 56
End: 2021-10-24

## 2022-02-13 ENCOUNTER — HEALTH MAINTENANCE LETTER (OUTPATIENT)
Age: 57
End: 2022-02-13

## 2022-05-27 ENCOUNTER — APPOINTMENT (OUTPATIENT)
Dept: URBAN - METROPOLITAN AREA CLINIC 252 | Age: 57
Setting detail: DERMATOLOGY
End: 2022-05-27

## 2022-05-27 VITALS — WEIGHT: 152 LBS | HEIGHT: 63 IN

## 2022-05-27 DIAGNOSIS — L738 OTHER SPECIFIED DISEASES OF HAIR AND HAIR FOLLICLES: ICD-10-CM

## 2022-05-27 DIAGNOSIS — L663 OTHER SPECIFIED DISEASES OF HAIR AND HAIR FOLLICLES: ICD-10-CM

## 2022-05-27 PROBLEM — L30.9 DERMATITIS, UNSPECIFIED: Status: ACTIVE | Noted: 2022-05-27

## 2022-05-27 PROCEDURE — OTHER PRESCRIPTION: OTHER

## 2022-05-27 PROCEDURE — 99214 OFFICE O/P EST MOD 30 MIN: CPT

## 2022-05-27 PROCEDURE — OTHER MIPS QUALITY: OTHER

## 2022-05-27 PROCEDURE — OTHER COUNSELING: OTHER

## 2022-05-27 RX ORDER — FLUCONAZOLE 200 MG/1
TABLET ORAL
Qty: 7 | Refills: 0 | Status: ERX | COMMUNITY
Start: 2022-05-27

## 2022-05-27 ASSESSMENT — LOCATION ZONE DERM
LOCATION ZONE: TRUNK
LOCATION ZONE: ARM
LOCATION ZONE: FACE

## 2022-05-27 ASSESSMENT — LOCATION SIMPLE DESCRIPTION DERM
LOCATION SIMPLE: RIGHT CHEEK
LOCATION SIMPLE: LEFT POSTERIOR UPPER ARM
LOCATION SIMPLE: LEFT CHEEK
LOCATION SIMPLE: RIGHT UPPER BACK
LOCATION SIMPLE: RIGHT POSTERIOR UPPER ARM

## 2022-05-27 ASSESSMENT — LOCATION DETAILED DESCRIPTION DERM
LOCATION DETAILED: RIGHT DISTAL POSTERIOR UPPER ARM
LOCATION DETAILED: RIGHT MID-UPPER BACK
LOCATION DETAILED: LEFT DISTAL MEDIAL POSTERIOR UPPER ARM
LOCATION DETAILED: LEFT CENTRAL MALAR CHEEK
LOCATION DETAILED: RIGHT MEDIAL MALAR CHEEK

## 2022-05-27 NOTE — HPI: RASH
Additional History: Using alcohol on these. Was using clindamycin and the dapsone and after 3-4 weeks no change.  Leaving for [place] thursday.

## 2022-05-27 NOTE — PROCEDURE: COUNSELING
Detail Level: Simple
Patient Specific Counseling (Will Not Stick From Patient To Patient): -Should fluconazole not improve the folliculitis Pt can call and will send Doxycycline 100 bid for 21 days with a 150 mg dose of diflucan because she reports getting yeast infections with most antibiotics. Discussed the arm problem may be the same as the face problem. Discussed considering DEET when working with her chickens or when outside but she reports she thinks DEET is bad for humans. Discussed there is no problem with DEET and especially if it were just for a few days to see if she stops getting new spots. Since excoriated a certain diagnosis cannot be made.

## 2022-06-01 ENCOUNTER — RX ONLY (RX ONLY)
Age: 57
End: 2022-06-01

## 2022-06-01 RX ORDER — VALACYCLOVIR 1 G/1
TABLET, FILM COATED ORAL
Qty: 20 | Refills: 0 | Status: ERX

## 2022-07-20 ENCOUNTER — RX ONLY (RX ONLY)
Age: 57
End: 2022-07-20

## 2022-07-20 RX ORDER — VALACYCLOVIR 1 G/1
TABLET, FILM COATED ORAL
Qty: 60 | Refills: 1 | Status: ERX | COMMUNITY
Start: 2022-07-20

## 2022-08-17 ENCOUNTER — RX ONLY (RX ONLY)
Age: 57
End: 2022-08-17

## 2022-08-17 RX ORDER — VALACYCLOVIR 1 G/1
TABLET, FILM COATED ORAL
Qty: 60 | Refills: 1 | Status: ERX | COMMUNITY
Start: 2022-08-16

## 2022-10-15 ENCOUNTER — HEALTH MAINTENANCE LETTER (OUTPATIENT)
Age: 57
End: 2022-10-15

## 2023-03-26 ENCOUNTER — HEALTH MAINTENANCE LETTER (OUTPATIENT)
Age: 58
End: 2023-03-26

## 2023-09-05 ENCOUNTER — TRANSCRIBE ORDERS (OUTPATIENT)
Dept: OTHER | Age: 58
End: 2023-09-05

## 2023-09-05 DIAGNOSIS — D68.51 FACTOR V LEIDEN (H): Primary | ICD-10-CM

## 2023-10-29 ENCOUNTER — HEALTH MAINTENANCE LETTER (OUTPATIENT)
Age: 58
End: 2023-10-29

## 2024-03-01 NOTE — PROGRESS NOTES
Toshia Savage  :  1965  DOS: 20  MRN: [id number]    Sports Medicine Clinic Visit    PCP: Arin Horne PHIL Savage is a 55 year old female who is seen in consultation at the request of  Ny Lu D.O. presenting with right knee.    Injury: Insidious gradual onset ~1 month(s).  Pain located over right anterior, deep and posterior knee, nonradiating.  Additional Features:  Positive: swelling, locking, instability and aching dull pain with intermitted sharp pain.  Symptoms are better with Rest and Elevation, brace.  Symptoms are worse with: Transitions, stairs, kneeling and squatting.  Other evaluation and/or treatments so far consists of: Ibuprofen, Rest, Elevation and brace.  Recent imaging completed: X-rays completed 20.  Prior History of related problems: None. Has had multiple cortisone shots in past ~3 years and currently being tested for Cushings.    Social History: Part-time retail work, recently retired from teaching    Interim History - 2020  Since last visit on 2020 patient has moderate right knee and left hip pain.  Patient reports that her generalized joint pain and bilateral ankle swelling is much improved after discontinuing Zetia medication in 20.  She wakes with minimal discomfort, but notes that right knee pain worsens through out the day. No interim injury.       Review of Systems  Musculoskeletal: as above  Remainder of review of systems is negative including constitutional, CV, pulmonary, GI, Skin and Neurologic except as noted in HPI or medical history.    Past Medical History:   Diagnosis Date     Anorexia nervosas     has since 10th grade     Asthma      Chemical dependency (H)     marijuana      Chronic back pain     chronic tranadol     Concussion 10/11/94     Cyst (solitary) of breast     right - removed     Depression with anxiety 04    treated     Exercise-induced asthma 2011     Herpes labialis   "    Hypothyroid      Migraine 1989    followed by Neuro     Pneumonia 8/99    hosp x 4days     Reflex sympathetic dystrophy     left foot     Vitamin D deficiency      Past Surgical History:   Procedure Laterality Date     COLONOSCOPY  5-13-11     LAPAROSCOPIC APPENDECTOMY N/A 8/15/2019    Procedure: APPENDECTOMY, LAPAROSCOPIC, possible Open;  Surgeon: Shila Appiah MD;  Location: [facility] OR     LAPAROSCOPIC HYSTERECTOMY TOTAL  6/08    BSO.  heavy menses, ovarian polyps, fam hx uterine ca     LAPAROSCOPY PROCEDURE UNLISTED  1987     TUBAL LIGATION  2004     ZZ NONSPECIFIC PROCEDURE  1988    cryosurgery     Z NONSPECIFIC PROCEDURE  5/98    right breast cyst benign     Z NONSPECIFIC PROCEDURE  2001    left     Family History   Problem Relation Age of Onset     Cancer Mother         uterine     Arthritis Mother      Depression Mother      Depression Father      Arthritis Father      Unknown/Adopted Father         limes disease     Respiratory Maternal Grandmother      Alzheimer Disease Maternal Grandfather      Alzheimer Disease Paternal Grandmother      Alcohol/Drug Paternal Grandfather        Objective  BP (!) 152/94   Ht 1.6 m (5' 3\")   Wt 71.7 kg (158 lb)   BMI 27.99 kg/m        General: healthy, alert and in no distress      HEENT: no scleral icterus or conjunctival erythema     Skin: no suspicious lesions or rash. No jaundice.     CV: regular rhythm by palpation, 2+ distal pulses, no pedal edema      Resp: normal respiratory effort without conversational dyspnea     Psych: normal mood and affect      Gait: mildly antalgic, appropriate coordination and balance     Neuro: normal light touch sensory exam of the extremities. Motor strength as noted below     Left Knee exam    ROM:        Flexion 100 degrees       Extension -6 degrees       Range of motion limited by pain, effusion    Inspection:       no visible ecchymosis        effusion noted small    Skin:       no visible deformities       well " perfused       capillary refill brisk    Patellar Motion:        Normal patellar tracking noted through range of motion       Crepitus noted in the patellofemoral joint    Tender:        medial patellar border       medial joint line       infrapatellar tendon       Lateral hamstring tendon    Non Tender:         remainder of knee area    Special Tests:        positive (+) Tam       neg (-) varus at 0 deg and 30 deg       neg (-) valgus at 0 deg and 30 deg       Positive pain with forced extension    Evaluation of ipsilateral kinetic chain       normal strength with hip extension and abduction       Decreased right quad activation    Painful ROM of left hip mildly actively, ROM full, + mild FADIR      Radiology    XR Knee Bilat AP W/Lat/Sun/PA Rt11/9/2020  [employer]  Result Narrative   EXAM: XR KNEE BILAT AP W/LAT/SUN/PA RT  LOCATION: [facility]  DATE/TIME: 11/9/2020 4:55 PM    INDICATION: Knee pain for a few weeks, tender along the joint line.  COMPARISON: None.    IMPRESSION:   RIGHT KNEE: Mild patellofemoral compartment right knee osteoarthritis. No acute knee fracture or dislocation. Small right knee joint effusion. Mild joint space narrowing medial compartment right knee. No significant anterior right knee soft tissue swelling.    LEFT KNEE: Mild joint space narrowing medial compartment left knee.   Other Result Information   Interface, In Rad Results - 11/09/2020  5:31 PM CST  EXAM: XR KNEE BILAT AP W/LAT/SUN/PA RT  LOCATION: [facility]  DATE/TIME: 11/9/2020 4:55 PM    INDICATION: Knee pain for a few weeks, tender along the joint line.  COMPARISON: None.    IMPRESSION:   RIGHT KNEE: Mild patellofemoral compartment right knee osteoarthritis. No acute knee fracture or dislocation. Small right knee joint effusion. Mild joint space narrowing medial compartment right knee. No significant anterior right knee soft tissue swelling.    LEFT KNEE: Mild joint space narrowing medial  compartment left knee.     Prior CT abdomen/pelvis reviewed in detail today, showing mild OA with acetabular spurring      Large Joint Injection/Arthocentesis: R knee joint    Date/Time: 12/30/2020 3:30 PM  Performed by: Tomasz Enrique DO  Authorized by: Tomasz Enrique DO     Indications:  Pain and osteoarthritis  Needle Size:  21 G  Guidance: ultrasound    Approach:  Superolateral  Location:  Knee      Medications:  3 mL ropivacaine 5 MG/ML; 40 mg triamcinolone 40 MG/ML  Aspirate amount (mL):  9  Aspirate:  Serous and yellow  Outcome:  Tolerated well, no immediate complications  Procedure discussed: discussed risks, benefits, and alternatives    Consent Given by:  Patient  Timeout: timeout called immediately prior to procedure    Prep: patient was prepped and draped in usual sterile fashion        Assessment:  1. Acute pain of right knee    2. Primary osteoarthritis of right knee    3. Left hip pain    4. Primary osteoarthritis of left hip        Plan:  Discussed the assessment with the patient.  Follow up: prn based on clinical progress  XR and CT images independently visualized and reviewed with patient today in clinic  Mild OA changes present, possible degenerative meniscus pathology as well, intermittent instability concerns  Neg workup for Cushing's, most of her constitutional sx and joint pains are improvd at least 50% since discontinuing Fetzima  Reviewed option for ongoing watchful waiting, MRI of knee (scheduled tomorrow), and likelihood of direct or indirect relationship to medication and current sx  She has underlying wear-and-tear regardless, and still has considerable pain with walking  Trial of US guided aspiration and CSI today, compression brace also provided  PT offered and ordered, basic HEP reviewed  Will watch left hip closely, early compensatory pain present, signs of mild OA on prior CT, can offer CSI in the future for labile pain but will hope to avoid especially if her  knee pain improves s/p injection  Assistive device options like cane reviewed  RICE and compression sleeve options and strategies reviewed  Reviewed wt loss, activity modification and progressive increase in activity as tolerated and guided by pain  Reviewed options for potential steroid vs viscosupplementation injections and the possibility for future orthopedic referral prn  Reviewed safe and appropriate OTC medication choices, try tylenol first  Up to 3000mg daily of tylenol is generally safe, NSAID dosing and duration limitations reviewed  Discussed nature of degenerative arthrosis of the knee.   Discussed symptom treatment with ice or heat, topical treatments, and rest if needed.   Expectations and goals of CSI reviewed  Often 2-3 days for steroid effect, and can take up to two weeks for maximum effect  We discussed modified progressive pain-free activity as tolerated  Do not overuse in first two weeks if feeling better due to concern for vulnerability while steroid is working  Supportive care reviewed  All questions were answered today  Contact us with additional questions or concerns  Signs and sx of concern reviewed      Tomasz Enrique DO, [facility]  Primary Care Sports Medicine  [facility]       Time spent in one-on-one evaluation and discussion with patient regarding nature of problem, course, prior treatments, and therapeutic options, at least 50% of which was spent in counseling and coordination of care: 27 minutes            Disclaimer: This note consists of symbols derived from keyboarding, dictation and/or voice recognition software. As a result, there may be errors in the script that have gone undetected. Please consider this when interpreting information found in this chart.     within normal limits

## 2024-05-26 ENCOUNTER — HEALTH MAINTENANCE LETTER (OUTPATIENT)
Age: 59
End: 2024-05-26

## (undated) DEVICE — STPL ENDO ARTICULATING 45MM EC45A

## (undated) DEVICE — ENDO TROCAR BLUNT TIP KII BALLOON 12X100MM C0R47

## (undated) DEVICE — LINEN TOWEL PACK X6 WHITE 5487

## (undated) DEVICE — ENDO POUCH UNIV RETRIEVAL SYSTEM INZII 10MM CD001

## (undated) DEVICE — ESU PENCIL W/COATED BLADE E2450H

## (undated) DEVICE — ENDO TROCAR FIRST ENTRY KII FIOS Z-THRD 05X100MM CTF03

## (undated) DEVICE — ANTIFOG SOLUTION W/FOAM PAD 31142527

## (undated) DEVICE — SU VICRYL 0 UR-6 27" J603H

## (undated) DEVICE — Device

## (undated) DEVICE — ANTIFOG SOLUTION W/FOAM PAD CF-1001

## (undated) DEVICE — ESU GROUND PAD ADULT W/CORD E7507

## (undated) DEVICE — ENDO TROCAR SLEEVE KII Z-THREADED 05X100MM CTS02

## (undated) DEVICE — TUBING SUCTION 10'X3/16" N510

## (undated) DEVICE — PREP CHLORAPREP 26ML TINTED ORANGE  260815

## (undated) DEVICE — SOL NACL 0.9% IRRIG 1000ML BOTTLE 2F7124

## (undated) DEVICE — STPL RELOAD REG TISSUE ECHELON 45 X 3.6MM BLUE GST45B

## (undated) DEVICE — SPONGE RAY-TEC 4X8" 7318

## (undated) DEVICE — LINEN TOWEL PACK X5 5464

## (undated) DEVICE — SUCTION TIP YANKAUER STR K87

## (undated) DEVICE — GLOVE PROTEXIS POWDER FREE SMT 6.5  2D72PT65X

## (undated) DEVICE — SUCTION IRR STRYKERFLOW II W/TIP 250-070-520

## (undated) DEVICE — ADH SKIN CLOSURE PREMIERPRO EXOFIN 1.0ML 3470

## (undated) DEVICE — SOL WATER IRRIG 1000ML BOTTLE 2F7114

## (undated) DEVICE — GLOVE PROTEXIS BLUE W/NEU-THERA 7.0  2D73EB70

## (undated) DEVICE — ESU HARMONIC ACE LAPAROSCOPIC SHEARS 5MMX36CM CVD HAR36

## (undated) DEVICE — SU MONOCRYL 4-0 PS-2 27" UND Y426H

## (undated) RX ORDER — LABETALOL HYDROCHLORIDE 5 MG/ML
INJECTION, SOLUTION INTRAVENOUS
Status: DISPENSED
Start: 2019-08-15

## (undated) RX ORDER — LIDOCAINE HYDROCHLORIDE 20 MG/ML
INJECTION, SOLUTION EPIDURAL; INFILTRATION; INTRACAUDAL; PERINEURAL
Status: DISPENSED
Start: 2019-08-15

## (undated) RX ORDER — SODIUM CHLORIDE, SODIUM LACTATE, POTASSIUM CHLORIDE, CALCIUM CHLORIDE 600; 310; 30; 20 MG/100ML; MG/100ML; MG/100ML; MG/100ML
INJECTION, SOLUTION INTRAVENOUS
Status: DISPENSED
Start: 2019-08-15

## (undated) RX ORDER — PROPOFOL 10 MG/ML
INJECTION, EMULSION INTRAVENOUS
Status: DISPENSED
Start: 2019-08-15

## (undated) RX ORDER — HYDROMORPHONE HYDROCHLORIDE 1 MG/ML
INJECTION, SOLUTION INTRAMUSCULAR; INTRAVENOUS; SUBCUTANEOUS
Status: DISPENSED
Start: 2019-08-15

## (undated) RX ORDER — ONDANSETRON 2 MG/ML
INJECTION INTRAMUSCULAR; INTRAVENOUS
Status: DISPENSED
Start: 2019-08-15

## (undated) RX ORDER — HYDROMORPHONE HCL/0.9% NACL/PF 0.2MG/0.2
SYRINGE (ML) INTRAVENOUS
Status: DISPENSED
Start: 2019-08-15

## (undated) RX ORDER — FENTANYL CITRATE 50 UG/ML
INJECTION, SOLUTION INTRAMUSCULAR; INTRAVENOUS
Status: DISPENSED
Start: 2019-08-15

## (undated) RX ORDER — ACETAMINOPHEN 325 MG/1
TABLET ORAL
Status: DISPENSED
Start: 2019-08-15